# Patient Record
Sex: FEMALE | Race: WHITE | Employment: OTHER | URBAN - METROPOLITAN AREA
[De-identification: names, ages, dates, MRNs, and addresses within clinical notes are randomized per-mention and may not be internally consistent; named-entity substitution may affect disease eponyms.]

---

## 2019-01-21 ENCOUNTER — OFFICE VISIT (OUTPATIENT)
Dept: ENDOCRINOLOGY | Age: 69
End: 2019-01-21

## 2019-01-21 VITALS
DIASTOLIC BLOOD PRESSURE: 61 MMHG | WEIGHT: 191.4 LBS | HEIGHT: 65 IN | SYSTOLIC BLOOD PRESSURE: 124 MMHG | BODY MASS INDEX: 31.89 KG/M2 | HEART RATE: 77 BPM

## 2019-01-21 DIAGNOSIS — E11.9 TYPE 2 DIABETES MELLITUS WITHOUT COMPLICATION, WITHOUT LONG-TERM CURRENT USE OF INSULIN (HCC): Primary | ICD-10-CM

## 2019-01-21 RX ORDER — LISINOPRIL 20 MG/1
TABLET ORAL DAILY
COMMUNITY
End: 2021-01-12

## 2019-01-21 RX ORDER — METFORMIN HYDROCHLORIDE 500 MG/1
TABLET, EXTENDED RELEASE ORAL
Qty: 120 TAB | Refills: 11 | Status: SHIPPED | OUTPATIENT
Start: 2019-01-21 | End: 2020-01-24 | Stop reason: SDUPTHER

## 2019-01-21 RX ORDER — ASPIRIN 81 MG/1
TABLET ORAL DAILY
COMMUNITY

## 2019-01-21 RX ORDER — ATORVASTATIN CALCIUM 40 MG/1
TABLET, FILM COATED ORAL DAILY
COMMUNITY

## 2019-01-21 RX ORDER — NATEGLINIDE 120 MG/1
120 TABLET ORAL
COMMUNITY
End: 2020-06-15 | Stop reason: SDUPTHER

## 2019-01-21 RX ORDER — CHOLECALCIFEROL TAB 125 MCG (5000 UNIT) 125 MCG
TAB ORAL DAILY
COMMUNITY

## 2019-01-21 RX ORDER — METFORMIN HYDROCHLORIDE 1000 MG/1
1000 TABLET ORAL 2 TIMES DAILY WITH MEALS
COMMUNITY
End: 2019-01-21 | Stop reason: SINTOL

## 2019-01-21 RX ORDER — ESCITALOPRAM OXALATE 10 MG/1
10 TABLET ORAL DAILY
COMMUNITY

## 2019-01-21 NOTE — PATIENT INSTRUCTIONS
Walking for Exercise: Care Instructions Your Care Instructions Walking is one of the easiest ways to get the exercise you need for good health. A brisk, 30-minute walk each day can help you feel better and have more energy. It can help you lower your risk of disease. Walking can help you keep your bones strong and your heart healthy. Check with your doctor before you start a walking plan if you have heart problems, other health issues, or you have not been active in a long time. Follow your doctor's instructions for safe levels of exercise. Follow-up care is a key part of your treatment and safety. Be sure to make and go to all appointments, and call your doctor if you are having problems. It's also a good idea to know your test results and keep a list of the medicines you take. How can you care for yourself at home? Getting started · Start slowly and set a short-term goal. For example, walk for 5 or 10 minutes every day. · Bit by bit, increase the amount you walk every day. Try for at least 30 minutes on most days of the week. You also may want to swim, bike, or do other activities. · If finding enough time is a problem, it is fine to be active in blocks of 10 minutes or more throughout your day and week. · To get the heart-healthy benefits of walking, you need to walk briskly enough to increase your heart rate and breathing, but not so fast that you cannot talk comfortably. · Wear comfortable shoes that fit well and provide good support for your feet and ankles. Staying with your plan · After you've made walking a habit, set a longer-term goal. You may want to set a goal of walking briskly for longer or walking farther. Experts say to do 2½ hours of moderate activity a week. A faster heartbeat is what defines moderate-level activity. · To stay motivated, walk with friends, coworkers, or pets. · Use a phone amanda or pedometer to track your steps each day.  Set a goal to increase your steps. Once you get there, set a higher goal. Aim for 10,000 steps a day. · If the weather keeps you from walking outside, go for walks at the mall with a friend. Local schools and churches may have indoor gyms where you can walk. Fitting a walk into your workday · Park several blocks away from work, or get off the bus a few stops early. · Use the stairs instead of the elevator, at least for a few floors. · Suggest holding meetings with colleagues during a walk inside or outside the building. · Use the restroom that is the farthest from your desk or workstation. · Use your morning and afternoon breaks to take quick 15-minute walks. Staying safe · Know your surroundings. Walk in a well-lighted, safe place. If it is dark, walk with a partner. Wear light-colored clothing. If you can, buy a vest or jacket that reflects light. · Carry a cell phone for emergencies. · Drink plenty of water. Take a water bottle with you when you walk. This is very important if it is hot out. · Be careful not to slip on wet or icy ground. You can buy \"grippers\" for your shoes to help keep you from slipping. · Pay attention to your walking surface. Use sidewalks and paths. · If you have breathing problems like asthma or COPD, ask your doctor when it is safe for you to walk outdoors. Cold, dry air, smog, pollen, or other things in the air could cause breathing problems. Where can you learn more? Go to http://myke-ayse.info/. Enter R159 in the search box to learn more about \"Walking for Exercise: Care Instructions. \" Current as of: August 19, 2018 Content Version: 11.9 © 8807-4269 [employer] Care instructions adapted under license by [employer] (which disclaims liability or warranty for this information).  If you have questions about a medical condition or this instruction, always ask your healthcare professional. [employer] disclaims any warranty or liability for your use of this information. Learning About Diabetes Food Guidelines Your Care Instructions Meal planning is important to manage diabetes. It helps keep your blood sugar at a target level (which you set with your doctor). You don't have to eat special foods. You can eat what your family eats, including sweets once in a while. But you do have to pay attention to how often you eat and how much you eat of certain foods. You may want to work with a dietitian or a certified diabetes educator (CDE) to help you plan meals and snacks. A dietitian or CDE can also help you lose weight if that is one of your goals. What should you know about eating carbs? Managing the amount of carbohydrate (carbs) you eat is an important part of healthy meals when you have diabetes. Carbohydrate is found in many foods. · Learn which foods have carbs. And learn the amounts of carbs in different foods. ? Bread, cereal, pasta, and rice have about 15 grams of carbs in a serving. A serving is 1 slice of bread (1 ounce), ½ cup of cooked cereal, or 1/3 cup of cooked pasta or rice. ? Fruits have 15 grams of carbs in a serving. A serving is 1 small fresh fruit, such as an apple or orange; ½ of a banana; ½ cup of cooked or canned fruit; ½ cup of fruit juice; 1 cup of melon or raspberries; or 2 tablespoons of dried fruit. ? Milk and no-sugar-added yogurt have 15 grams of carbs in a serving. A serving is 1 cup of milk or 2/3 cup of no-sugar-added yogurt. ? Starchy vegetables have 15 grams of carbs in a serving. A serving is ½ cup of mashed potatoes or sweet potato; 1 cup winter squash; ½ of a small baked potato; ½ cup of cooked beans; or ½ cup cooked corn or green peas. · Learn how much carbs to eat each day and at each meal. A dietitian or CDE can teach you how to keep track of the amount of carbs you eat. This is called carbohydrate counting. · If you are not sure how to count carbohydrate grams, use the Plate Method to plan meals. It is a good, quick way to make sure that you have a balanced meal. It also helps you spread carbs throughout the day. ? Divide your plate by types of foods. Put non-starchy vegetables on half the plate, meat or other protein food on one-quarter of the plate, and a grain or starchy vegetable in the final quarter of the plate. To this you can add a small piece of fruit and 1 cup of milk or yogurt, depending on how many carbs you are supposed to eat at a meal. 
· Try to eat about the same amount of carbs at each meal. Do not \"save up\" your daily allowance of carbs to eat at one meal. 
· Proteins have very little or no carbs per serving. Examples of proteins are beef, chicken, turkey, fish, eggs, tofu, cheese, cottage cheese, and peanut butter. A serving size of meat is 3 ounces, which is about the size of a deck of cards. Examples of meat substitute serving sizes (equal to 1 ounce of meat) are 1/4 cup of cottage cheese, 1 egg, 1 tablespoon of peanut butter, and ½ cup of tofu. How can you eat out and still eat healthy? · Learn to estimate the serving sizes of foods that have carbohydrate. If you measure food at home, it will be easier to estimate the amount in a serving of restaurant food. · If the meal you order has too much carbohydrate (such as potatoes, corn, or baked beans), ask to have a low-carbohydrate food instead. Ask for a salad or green vegetables. · If you use insulin, check your blood sugar before and after eating out to help you plan how much to eat in the future. · If you eat more carbohydrate at a meal than you had planned, take a walk or do other exercise. This will help lower your blood sugar. What else should you know? · Limit saturated fat, such as the fat from meat and dairy products.  This is a healthy choice because people who have diabetes are at higher risk of heart disease. So choose lean cuts of meat and nonfat or low-fat dairy products. Use olive or canola oil instead of butter or shortening when cooking. · Don't skip meals. Your blood sugar may drop too low if you skip meals and take insulin or certain medicines for diabetes. · Check with your doctor before you drink alcohol. Alcohol can cause your blood sugar to drop too low. Alcohol can also cause a bad reaction if you take certain diabetes medicines. Follow-up care is a key part of your treatment and safety. Be sure to make and go to all appointments, and call your doctor if you are having problems. It's also a good idea to know your test results and keep a list of the medicines you take. Where can you learn more? Go to http://myke-ayse.info/. Enter Z790 in the search box to learn more about \"Learning About Diabetes Food Guidelines. \" Current as of: July 25, 2018 Content Version: 11.9 © 5010-7354 [employer]. Care instructions adapted under license by [employer] (which disclaims liability or warranty for this information). If you have questions about a medical condition or this instruction, always ask your healthcare professional. [address] any warranty or liability for your use of this information.

## 2019-01-21 NOTE — PROGRESS NOTES
Chief Complaint Patient presents with  Diabetes  
  pcp and pharmacy verified. Release signed for eye exam  
Records reviewed History of Present Illness: Ruiz Botello is a 77 y.o. female who I was asked to see in consult, by Dr. Hammond Erps is a new patient for evaluation of diabetes. Was diagnosed with diabetes 10+ years. Current regimen is Nateglinide 120mg TID, Metformin 1000mg BID, Victoza 1.8mg daily, Invokana 300mg daily. Checks blood sugars fasting every day and readings run 140-170's. She notes that over her last year, her A1C has been \"creeping up\". Most recent Hgb A1c was 7.3% in November 2018. Pt notes that the Metformin is causing issues of diarrhea. . A typical day is as follows: Pt wakes around 8AM. 
- breakfast: She has breakfast around 830AM, this AM she had a piece of sausage, a biscuit and hot tea (no sugar) - She does not tend to have a mid-morning snack 
- lunch: She has lunch around 1230PM, yesterday she had chow-mein with beef and mushrooms and water. - She will have a mid-afternoon snack around 3PM. She will have a cookie, peanuts or PB. 
- dinner: She has dinner around 6PM, last night she had vegetable soup and unsweetened tea. - She will have an evening snack (not every day). Typically a cookie. Exercise consists of housework. No history of vascular disease. No history of neuropathy, or nephropathy. Last eye exam was \"a year ago and I am due for my yearly\". No hx of retinopathy. Will request these records. Past Medical History:  
Diagnosis Date  Diabetes ([address]  Hypertension Past Surgical History:  
Procedure Laterality Date  HX ORTHOPAEDIC    
 replaced ligaments in both thumbs  HX PARTIAL HYSTERECTOMY  HX TONSIL AND ADENOIDECTOMY  HX WISDOM TEETH EXTRACTION    
 IR CHOLECYSTOSTOMY PERCUTANEOUS Current Outpatient Medications Medication Sig  liraglutide (VICTOZA) 0.6 mg/0.1 mL (18 mg/3 mL) pnij 1.8 mg by SubCUTAneous route.  nateglinide (STARLIX) 120 mg tablet Take 120 mg by mouth Before breakfast, lunch, and dinner.  canagliflozin (INVOKANA) 300 mg tablet Take  by mouth Daily (before breakfast).  lisinopril (PRINIVIL, ZESTRIL) 20 mg tablet Take  by mouth daily.  atorvastatin (LIPITOR) 40 mg tablet Take  by mouth daily.  escitalopram oxalate (LEXAPRO) 10 mg tablet Take 10 mg by mouth daily.  calcium-cholecalciferol, d3, (CALCIUM 600 + D) 600-125 mg-unit tab Take  by mouth two (2) times a day.  cholecalciferol, VITAMIN D3, (VITAMIN D3) 5,000 unit tab tablet Take  by mouth daily.  aspirin delayed-release 81 mg tablet Take  by mouth daily.  metFORMIN ER (GLUCOPHAGE XR) 500 mg tablet Two pills with breakfast and two pills with dinner No current facility-administered medications for this visit. Allergies Allergen Reactions  Codeine Itching and Swelling Family History Problem Relation Age of Onset  Diabetes Mother  Heart Failure Mother  Heart Attack Father  Arthritis-osteo Sister  Diabetes Sister Borderline  Heart Disease Brother   
     as an infant  Heart Failure Maternal Grandmother  Diabetes Maternal Grandmother  Heart Attack Maternal Grandfather  No Known Problems Paternal Grandmother  No Known Problems Paternal Grandfather  Diabetes Maternal Aunt  Cancer Maternal Uncle Lung Social History Socioeconomic History  Marital status:  Spouse name: Not on file  Number of children: Not on file  Years of education: Not on file  Highest education level: Not on file Social Needs  Financial resource strain: Not on file  Food insecurity - worry: Not on file  Food insecurity - inability: Not on file  Transportation needs - medical: Not on file  Transportation needs - non-medical: Not on file Occupational History  Not on file Tobacco Use  
  Smoking status: Never Smoker  Smokeless tobacco: Never Used Substance and Sexual Activity  Alcohol use: Yes Comment: occasionally  Drug use: No  
 Sexual activity: Not on file Other Topics Concern  Not on file Social History Narrative  Not on file Review of Systems: 
- Constitutional Symptoms: no fevers, chills, weight loss - Eyes: no blurry vision or double vision - Cardiovascular: no chest pain or palpitations - Respiratory: no cough or shortness of breath 
- Gastrointestinal: no dysphagia or abdominal pain - Musculoskeletal: no joint pains or weakness - Integumentary: no rashes - Neurological: no numbness, tingling, or headaches - Psychiatric: no depression or anxiety - Endocrine: no heat or cold intolerance, no polyuria or polydipsia Physical Examination: 
Blood pressure 124/61, pulse 77, height 5' 4.75\" (1.645 m), weight 191 lb 6.4 oz (86.8 kg). - General: pleasant, no distress, good eye contact 
- HEENT: no exopthalmos, no periorbital edema, no scleral/conjunctival injection, EOMI, no lid lag or stare 
- Neck: supple, no thyromegaly, masses, lymph nodes, or carotid bruits, no supraclavicular or dorsocervical fat pads - Cardiovascular: regular, normal rate, normal S1 and S2, no murmurs/rubs/gallops, 2+ dorsalis pedis pulses bilaterally - Respiratory: clear to auscultation bilaterally - Gastrointestinal: soft, nontender, nondistended, no masses, no hepatosplenomegaly - Musculoskeletal: no proximal muscle weakness in upper or lower extremities - Integumentary: no acanthosis nigricans, no abdominal striae, no rashes, no edema, no foot ulcers - Neurological: DTR 2+, no tremors - Psychiatric: normal mood and affect Diabetic foot exam:  
 
Left Foot: 
 Visual Exam: normal  
 Pulse DP: 2+ (normal) Filament test: normal sensation Vibratory sensation: normal 
   
Right Foot: 
 Visual Exam: normal  
 Pulse DP: 2+ (normal) Filament test: normal sensation Vibratory sensation: normal 
 
 
 
Data Reviewed:  
- Hgb A1c 7.3% 
- lipids: total 113 ,  HDL 34, , LDL 26 
- ALT 34, AST 23 
- BUN/Cr 15/0.50 Assessment/Plan: 1. Type 2 diabetes mellitus without complication, without long-term current use of insulin ([address] 1) Pt's BGs and A1C have been increasing gradually over the last year. Her most recent A1C was 7.3%. Pt does not seem to be eating an excessive amount of carbs, though she is not physically active. Before we make any changes in her medications, I recommended that she first work on increasing her physical activity. Pt given copy of \"Daily Diabetes Meal Planning Guide\" and educated about the \"[place] dinner plate\", reading food labels and which foods have the highest carbohydrates. Pt instructed to limit her carbohydrates to 45-60 grams with meals and 15 grams or less with snacks (but to limit snacks). Pt to check her BGs daily and mail her BG logs to me in 6 weeks. Will check an A1C and urine MA today. Pt voices understanding and agreement with the plan. RTC 3 months Patient Instructions Walking for Exercise: Care Instructions Your Care Instructions Walking is one of the easiest ways to get the exercise you need for good health. A brisk, 30-minute walk each day can help you feel better and have more energy. It can help you lower your risk of disease. Walking can help you keep your bones strong and your heart healthy. Check with your doctor before you start a walking plan if you have heart problems, other health issues, or you have not been active in a long time. Follow your doctor's instructions for safe levels of exercise. Follow-up care is a key part of your treatment and safety. Be sure to make and go to all appointments, and call your doctor if you are having problems. It's also a good idea to know your test results and keep a list of the medicines you take. How can you care for yourself at home? Getting started · Start slowly and set a short-term goal. For example, walk for 5 or 10 minutes every day. · Bit by bit, increase the amount you walk every day. Try for at least 30 minutes on most days of the week. You also may want to swim, bike, or do other activities. · If finding enough time is a problem, it is fine to be active in blocks of 10 minutes or more throughout your day and week. · To get the heart-healthy benefits of walking, you need to walk briskly enough to increase your heart rate and breathing, but not so fast that you cannot talk comfortably. · Wear comfortable shoes that fit well and provide good support for your feet and ankles. Staying with your plan · After you've made walking a habit, set a longer-term goal. You may want to set a goal of walking briskly for longer or walking farther. Experts say to do 2½ hours of moderate activity a week. A faster heartbeat is what defines moderate-level activity. · To stay motivated, walk with friends, coworkers, or pets. · Use a phone amanda or pedometer to track your steps each day. Set a goal to increase your steps. Once you get there, set a higher goal. Aim for 10,000 steps a day. · If the weather keeps you from walking outside, go for walks at the mall with a friend. Local schools and churches may have indoor gyms where you can walk. Fitting a walk into your workday · Park several blocks away from work, or get off the bus a few stops early. · Use the stairs instead of the elevator, at least for a few floors. · Suggest holding meetings with colleagues during a walk inside or outside the building. · Use the restroom that is the farthest from your desk or workstation. · Use your morning and afternoon breaks to take quick 15-minute walks. Staying safe · Know your surroundings. Walk in a well-lighted, safe place. If it is dark, walk with a partner. Wear light-colored clothing. If you can, buy a vest or jacket that reflects light. · Carry a cell phone for emergencies. · Drink plenty of water. Take a water bottle with you when you walk. This is very important if it is hot out. · Be careful not to slip on wet or icy ground. You can buy \"grippers\" for your shoes to help keep you from slipping. · Pay attention to your walking surface. Use sidewalks and paths. · If you have breathing problems like asthma or COPD, ask your doctor when it is safe for you to walk outdoors. Cold, dry air, smog, pollen, or other things in the air could cause breathing problems. Where can you learn more? Go to http://myke-ayse.info/. Enter R159 in the search box to learn more about \"Walking for Exercise: Care Instructions. \" Current as of: August 19, 2018 Content Version: 11.9 © 4555-7850 [employer] [place]. Care instructions adapted under license by [employer] (which disclaims liability or warranty for this information). If you have questions about a medical condition or this instruction, always ask your healthcare professional. [address] any warranty or liability for your use of this information. Learning About Diabetes Food Guidelines Your Care Instructions Meal planning is important to manage diabetes. It helps keep your blood sugar at a target level (which you set with your doctor). You don't have to eat special foods. You can eat what your family eats, including sweets once in a while. But you do have to pay attention to how often you eat and how much you eat of certain foods. You may want to work with a dietitian or a certified diabetes educator (CDE) to help you plan meals and snacks. A dietitian or CDE can also help you lose weight if that is one of your goals. What should you know about eating carbs? Managing the amount of carbohydrate (carbs) you eat is an important part of healthy meals when you have diabetes. Carbohydrate is found in many foods. · Learn which foods have carbs. And learn the amounts of carbs in different foods. ? Bread, cereal, pasta, and rice have about 15 grams of carbs in a serving. A serving is 1 slice of bread (1 ounce), ½ cup of cooked cereal, or 1/3 cup of cooked pasta or rice. ? Fruits have 15 grams of carbs in a serving. A serving is 1 small fresh fruit, such as an apple or orange; ½ of a banana; ½ cup of cooked or canned fruit; ½ cup of fruit juice; 1 cup of melon or raspberries; or 2 tablespoons of dried fruit. ? Milk and no-sugar-added yogurt have 15 grams of carbs in a serving. A serving is 1 cup of milk or 2/3 cup of no-sugar-added yogurt. ? Starchy vegetables have 15 grams of carbs in a serving. A serving is ½ cup of mashed potatoes or sweet potato; 1 cup winter squash; ½ of a small baked potato; ½ cup of cooked beans; or ½ cup cooked corn or green peas. · Learn how much carbs to eat each day and at each meal. A dietitian or CDE can teach you how to keep track of the amount of carbs you eat. This is called carbohydrate counting. · If you are not sure how to count carbohydrate grams, use the Plate Method to plan meals. It is a good, quick way to make sure that you have a balanced meal. It also helps you spread carbs throughout the day. ? Divide your plate by types of foods. Put non-starchy vegetables on half the plate, meat or other protein food on one-quarter of the plate, and a grain or starchy vegetable in the final quarter of the plate. To this you can add a small piece of fruit and 1 cup of milk or yogurt, depending on how many carbs you are supposed to eat at a meal. 
· Try to eat about the same amount of carbs at each meal. Do not \"save up\" your daily allowance of carbs to eat at one meal. 
· Proteins have very little or no carbs per serving. Examples of proteins are beef, chicken, turkey, fish, eggs, tofu, cheese, cottage cheese, and peanut butter.  A serving size of meat is 3 ounces, which is about the size of a deck of cards. Examples of meat substitute serving sizes (equal to 1 ounce of meat) are 1/4 cup of cottage cheese, 1 egg, 1 tablespoon of peanut butter, and ½ cup of tofu. How can you eat out and still eat healthy? · Learn to estimate the serving sizes of foods that have carbohydrate. If you measure food at home, it will be easier to estimate the amount in a serving of restaurant food. · If the meal you order has too much carbohydrate (such as potatoes, corn, or baked beans), ask to have a low-carbohydrate food instead. Ask for a salad or green vegetables. · If you use insulin, check your blood sugar before and after eating out to help you plan how much to eat in the future. · If you eat more carbohydrate at a meal than you had planned, take a walk or do other exercise. This will help lower your blood sugar. What else should you know? · Limit saturated fat, such as the fat from meat and dairy products. This is a healthy choice because people who have diabetes are at higher risk of heart disease. So choose lean cuts of meat and nonfat or low-fat dairy products. Use olive or canola oil instead of butter or shortening when cooking. · Don't skip meals. Your blood sugar may drop too low if you skip meals and take insulin or certain medicines for diabetes. · Check with your doctor before you drink alcohol. Alcohol can cause your blood sugar to drop too low. Alcohol can also cause a bad reaction if you take certain diabetes medicines. Follow-up care is a key part of your treatment and safety. Be sure to make and go to all appointments, and call your doctor if you are having problems. It's also a good idea to know your test results and keep a list of the medicines you take. Where can you learn more? Go to http://myke-ayse.info/. Enter D295 in the search box to learn more about \"Learning About Diabetes Food Guidelines. \" Current as of: July 25, 2018 Content Version: 11.9 © 1789-4201 [employer]. Care instructions adapted under license by [employer] (which disclaims liability or warranty for this information). If you have questions about a medical condition or this instruction, always ask your healthcare professional. [address] any warranty or liability for your use of this information. Follow-up Disposition: 
Return in about 3 months (around 4/21/2019). Copy sent to: Dr. Jacqueline Michel

## 2019-01-21 NOTE — LETTER
1/21/2019 1:45 PM 
 
Patient:  Tammi Loaiza YOB: 1950 Date of Visit: 1/21/2019 Dear Suzie Conti MD 
[address] [address] [id number] VIA Facsimile: [phone] 
 : Thank you for referring Ms. Tammi Loaiza to me for evaluation/treatment. Below are the relevant portions of my assessment and plan of care. If you have questions, please do not hesitate to call me. I look forward to following Ms. Cathleen Goldberg along with you. Sincerely, Frantz Cantrell MD

## 2019-01-22 LAB
ALBUMIN/CREAT UR: <5.6 MG/G CREAT (ref 0–30)
CREAT UR-MCNC: 53.5 MG/DL
EST. AVERAGE GLUCOSE BLD GHB EST-MCNC: 174 MG/DL
HBA1C MFR BLD: 7.7 % (ref 4.8–5.6)
MICROALBUMIN UR-MCNC: <3 UG/ML

## 2019-03-12 ENCOUNTER — TELEPHONE (OUTPATIENT)
Dept: ENDOCRINOLOGY | Age: 69
End: 2019-03-12

## 2019-05-06 NOTE — TELEPHONE ENCOUNTER
Pt called, she wants to know if Dr. Yokasta El can Yokasta El can refill her Rx Invokana 300 mg. She stated her PCP was getting her refills before, since Dr. Yokasta El now is the one who's taking care of her diabetes. She states her PCP would no longer refill this for her. Any questions, she can be reach @ [phone].

## 2019-07-16 ENCOUNTER — OFFICE VISIT (OUTPATIENT)
Dept: ENDOCRINOLOGY | Age: 69
End: 2019-07-16

## 2019-07-16 VITALS
BODY MASS INDEX: 31.55 KG/M2 | SYSTOLIC BLOOD PRESSURE: 114 MMHG | HEART RATE: 92 BPM | RESPIRATION RATE: 16 BRPM | DIASTOLIC BLOOD PRESSURE: 58 MMHG | HEIGHT: 64 IN | WEIGHT: 184.8 LBS | OXYGEN SATURATION: 96 %

## 2019-07-16 DIAGNOSIS — E11.9 TYPE 2 DIABETES MELLITUS WITHOUT COMPLICATION, WITHOUT LONG-TERM CURRENT USE OF INSULIN (HCC): Primary | ICD-10-CM

## 2019-07-16 LAB — HBA1C MFR BLD HPLC: 6.6 %

## 2019-07-16 NOTE — PROGRESS NOTES
Chief Complaint   Patient presents with    Diabetes   Records since last visit reviewed. History of Present Illness: Henry Peralta is a 68 y.o. female here for follow up of diabetes. She was diagnosed with diabetes 10+ years. At our initial visit in January 2019 her regimen consisted of Nateglinide 120mg TID, Metformin 1000mg BID, Victoza 1.8mg daily and Invokana 300mg daily. Her BGs and A1C had been slowly increasing over the last year and her A1C in January 2019 was 7.7%. She was not eating an excessive amount of carbs, but she was not very active so I recommended she work on increasing her physical activities, but not change her DM regimen. Her A1C today was down to 6.6%. She denies issues of illnesses, injuries or hospitalizations since our last visit. Nateglinide 120mg TID, Metformin 1000mg BID, Victoza 1.8mg daily and Invokana 300mg daily. Checks blood sugars fasting every day and readings run 130-190's in July. Pt notes that if she eats a starch in the evening it will cause her BGs to run much higher the next morning. She denies issues of hypoglycemia. A typical day is as follows:  Pt wakes around 8AM.  - breakfast: She has breakfast around 830AM, this AM she had a bowl of cereal and tomato juice. - She does not tend to have a mid-morning snack  - lunch: She has lunch around 1230PM, this afternoon she had chicken salad, no bread or crackers and water. - She will have a mid-afternoon snack around 3PM. She will have a cookie, peanuts or PB crackers. - dinner: She has dinner around 6PM, last night she had shrimp chow mein, some rice and water. - She will have an evening snack (not every day). Typically a cookie. Exercise consists of housework. She goes to knitting and quilting classes at [employer]. No history of vascular disease. No history of neuropathy, or nephropathy. Last eye exam was January 2019, no retinopathy, record reviewed.        Current Outpatient Medications Medication Sig    canagliflozin (INVOKANA) 300 mg tablet Take 1 Tab by mouth Daily (before breakfast).  liraglutide (VICTOZA) 0.6 mg/0.1 mL (18 mg/3 mL) pnij 1.8 mg by SubCUTAneous route.  nateglinide (STARLIX) 120 mg tablet Take 120 mg by mouth Before breakfast, lunch, and dinner.  lisinopril (PRINIVIL, ZESTRIL) 20 mg tablet Take  by mouth daily.  atorvastatin (LIPITOR) 40 mg tablet Take  by mouth daily.  escitalopram oxalate (LEXAPRO) 10 mg tablet Take 10 mg by mouth daily.  calcium-cholecalciferol, d3, (CALCIUM 600 + D) 600-125 mg-unit tab Take  by mouth two (2) times a day.  cholecalciferol, VITAMIN D3, (VITAMIN D3) 5,000 unit tab tablet Take  by mouth daily.  aspirin delayed-release 81 mg tablet Take  by mouth daily.  metFORMIN ER (GLUCOPHAGE XR) 500 mg tablet Two pills with breakfast and two pills with dinner     No current facility-administered medications for this visit. Allergies   Allergen Reactions    Codeine Itching and Swelling     Review of Systems:  - Eyes: no blurry vision or double vision  - Cardiovascular: no chest pain  - Respiratory: no shortness of breath  - Musculoskeletal: no myalgias  - Neurological: no numbness/tingling in extremities    Physical Examination:  Blood pressure 114/58, pulse 92, resp. rate 16, height 5' 4\" (1.626 m), weight 184 lb 12.8 oz (83.8 kg), SpO2 96 %.   - General: pleasant, no distress, good eye contact   - Neck: no carotid bruits  - Cardiovascular: regular, normal rate, nl s1 and s2, no m/r/g, 2+ DP pulses   - Respiratory: clear bilaterally  - Integumentary: no edema, no foot ulcers,   - Psychiatric: normal mood and affect    Diabetic foot exam:     Left Foot:   Visual Exam: normal    Pulse DP: 2+ (normal)   Filament test: normal sensation    Vibratory sensation: normal      Right Foot:   Visual Exam: normal    Pulse DP: 2+ (normal)   Filament test: normal sensation    Vibratory sensation: normal        Data Reviewed:   Her A1C today was 6.6%. Assessment/Plan:   1) DM > Her A1C today was 6.6%. Pt encouraged to keep up the good work and continue the Nateglinide 120mg TID, Metformin 1000mg BID, Victoza 1.8mg daily and Invokana 300mg daily. I recommended pt to go to [employer] 30 minutes early to her knitting classes three days per week so she could walk in the [facility]. Pt to check her BGs daily and mail her BG logs to me in 6 weeks. Pt voices understanding and agreement with the plan. RTC 3 months  There are no Patient Instructions on file for this visit. Follow-up and Dispositions    · Return in about 3 months (around 10/16/2019).          Copy sent to:  Dr. Darby Perez

## 2019-09-04 ENCOUNTER — TELEPHONE (OUTPATIENT)
Dept: ENDOCRINOLOGY | Age: 69
End: 2019-09-04

## 2019-09-04 NOTE — TELEPHONE ENCOUNTER
9/4/2019  2:12 PM    Patient stated she received a text to call the office. She would like a call back.     Thanks

## 2019-09-04 NOTE — TELEPHONE ENCOUNTER
Reviewed BG logs. Pt instructed to continue the Nateglinide 120mg TID, Metformin 1000mg BID, Victoza 1.8mg daily and Invokana 300mg daily. Pt voices understanding and agreement with the plan.

## 2019-09-16 NOTE — TELEPHONE ENCOUNTER
9/16/2019  9:51 AM        Ms.  Javier Cory called stating that a new prescription is needed for   liraglutide (VICTOZA) 0.6 mg/0.1 mL (18 mg/3 mL) pnij    Please send to   [id number] [facility]        Thanks

## 2019-10-18 ENCOUNTER — OFFICE VISIT (OUTPATIENT)
Dept: ENDOCRINOLOGY | Age: 69
End: 2019-10-18

## 2019-10-18 VITALS
WEIGHT: 188.4 LBS | HEART RATE: 93 BPM | SYSTOLIC BLOOD PRESSURE: 121 MMHG | BODY MASS INDEX: 32.17 KG/M2 | DIASTOLIC BLOOD PRESSURE: 63 MMHG | HEIGHT: 64 IN

## 2019-10-18 DIAGNOSIS — E11.9 TYPE 2 DIABETES MELLITUS WITHOUT COMPLICATION, WITHOUT LONG-TERM CURRENT USE OF INSULIN (HCC): Primary | ICD-10-CM

## 2019-10-18 LAB — HBA1C MFR BLD HPLC: 6.9 %

## 2019-10-18 RX ORDER — PEN NEEDLE, DIABETIC 32GX 5/32"
NEEDLE, DISPOSABLE MISCELLANEOUS
COMMUNITY
Start: 2019-07-24

## 2019-10-18 RX ORDER — LANCETS
EACH MISCELLANEOUS
Refills: 0 | COMMUNITY
Start: 2019-10-07 | End: 2020-04-10

## 2019-10-18 NOTE — PROGRESS NOTES
Chief Complaint   Patient presents with    Diabetes     pcp and pharmacy verified. Eye exam UTD   Records since last visit reviewed. History of Present Illness: Robbert Osler is a 68 y.o. female here for follow up of diabetes. She was diagnosed with diabetes 10+ years. Her A1C today was down to 6.9%. Pt fell in September. She saw Dr. Eda Estevez who sent her for PT. Nateglinide 120mg TID, Metformin 1000mg BID, Victoza 1.8mg daily and Invokana 300mg daily. Checks blood sugars fasting every day and readings run 150-180's. She denies issues of hypoglycemia. A typical day is as follows:  Pt wakes around 8AM.  - breakfast: She has breakfast around 830AM, yesterday she had a waffle, a piece of sausage, hash browns and water. - She does not tend to have a mid-morning snack  - lunch: She has lunch around 1230PM, yesterday afternoon she had a tortilla with turkey breast and cheese and water. - She will have a mid-afternoon snack around 3PM. She will have a cookie, peanuts or PB crackers. - dinner: She has dinner around 6PM, last night she had steak, sweet potato, salad and water. - She has been working on stopping the evening snacking, she will occasionally have a PB cracker. Exercise consists of housework. She goes to knitting and quilting classes at [employer]. No history of vascular disease. No history of neuropathy, or nephropathy. Last eye exam was January 2019, no retinopathy, record reviewed. Current Outpatient Medications   Medication Sig    liraglutide (VICTOZA) 0.6 mg/0.1 mL (18 mg/3 mL) pnij 1.8 mg by SubCUTAneous route daily.  canagliflozin (INVOKANA) 300 mg tablet Take 1 Tab by mouth Daily (before breakfast).  nateglinide (STARLIX) 120 mg tablet Take 120 mg by mouth Before breakfast, lunch, and dinner.  lisinopril (PRINIVIL, ZESTRIL) 20 mg tablet Take  by mouth daily.  atorvastatin (LIPITOR) 40 mg tablet Take  by mouth daily.     escitalopram oxalate (LEXAPRO) 10 mg tablet Take 10 mg by mouth daily.  calcium-cholecalciferol, d3, (CALCIUM 600 + D) 600-125 mg-unit tab Take  by mouth two (2) times a day.  cholecalciferol, VITAMIN D3, (VITAMIN D3) 5,000 unit tab tablet Take  by mouth daily.  aspirin delayed-release 81 mg tablet Take  by mouth daily.  metFORMIN ER (GLUCOPHAGE XR) 500 mg tablet Two pills with breakfast and two pills with dinner    ONETOUCH ULTRA BLUE TEST STRIP strip     ONETOUCH ULTRASOFT LANCETS misc     PRICILLA PEN NEEDLE 32 gauge x 5/32\" ndle      No current facility-administered medications for this visit. Allergies   Allergen Reactions    Codeine Itching and Swelling     Review of Systems:  - Eyes: no blurry vision or double vision  - Cardiovascular: no chest pain  - Respiratory: no shortness of breath  - Musculoskeletal: no myalgias  - Neurological: no numbness/tingling in extremities    Physical Examination:  Blood pressure 121/63, pulse 93, height 5' 4\" (1.626 m), weight 188 lb 6.4 oz (85.5 kg). - General: pleasant, no distress, good eye contact   - Neck: no carotid bruits  - Cardiovascular: regular, normal rate, nl s1 and s2, no m/r/g, 2+ DP pulses   - Respiratory: clear bilaterally  - Integumentary: no edema, no foot ulcers,   - Psychiatric: normal mood and affect    Diabetic foot exam:     Left Foot:   Visual Exam: normal    Pulse DP: 2+ (normal)   Filament test: normal sensation    Vibratory sensation: normal      Right Foot:   Visual Exam: normal    Pulse DP: 2+ (normal)   Filament test: normal sensation    Vibratory sensation: normal        Data Reviewed:   Her A1C today was 6.9%. Assessment/Plan:   1) DM > Her A1C today was 6.9%. Pt encouraged to keep up the good work and continue the Nateglinide 120mg TID, Metformin 1000mg BID, Victoza 1.8mg daily and Invokana 300mg daily. Pt to check her BGs daily and mail her BG logs to me in 6 weeks. Pt voices understanding and agreement with the plan.     RTC 3 months  There are no Patient Instructions on file for this visit. Follow-up and Dispositions    · Return in about 3 months (around 1/18/2020).          Copy sent to:  Dr. Agusto Carson

## 2019-11-04 RX ORDER — CANAGLIFLOZIN 300 MG/1
TABLET, FILM COATED ORAL
Qty: 30 TAB | Refills: 5 | Status: SHIPPED | OUTPATIENT
Start: 2019-11-04 | End: 2020-01-24 | Stop reason: SDUPTHER

## 2019-12-03 ENCOUNTER — TELEPHONE (OUTPATIENT)
Dept: ENDOCRINOLOGY | Age: 69
End: 2019-12-03

## 2019-12-03 NOTE — TELEPHONE ENCOUNTER
Reviewed BG logs. Called pt and instructed her to continue the Nateglinide 120mg TID, Metformin 1000mg BID, Victoza 1.8mg daily and Invokana 300mg daily.

## 2019-12-05 ENCOUNTER — TELEPHONE (OUTPATIENT)
Dept: ENDOCRINOLOGY | Age: 69
End: 2019-12-05

## 2019-12-05 NOTE — TELEPHONE ENCOUNTER
Left a message on Nia's VM stating than an appeal was written and faxed yesterday. Is this the denial of the appeal or the original denial of Invokana?

## 2019-12-05 NOTE — TELEPHONE ENCOUNTER
----- Message from Toño Ray sent at 12/5/2019 10:35 AM EST -----  Regarding: /telephone  General Message/Vendor Calls    Caller's first and last name: Miguelito Mena with [facility]. Reason for call: Miguelito Mena stated that the request for \"Envoc[employer]\" was not approved. Callback required yes/no and why: yes.       Best contact number(s): [phone]      Details to clarify the request:

## 2019-12-10 NOTE — TELEPHONE ENCOUNTER
Per Clemencia Barone at [facility], Part D, no PA is needed. A paid claim was submitted on 11/28/19. Patient has tried to fill too soon, which is why the PA was requested.

## 2019-12-16 NOTE — TELEPHONE ENCOUNTER
Addendum: 12/16/2019, 10:41 AM  Spoke with Darryn Thompson by phone,    Received a letter from [employer] denying the request for an appeal. Patient states that she did  a rx for Invokana and all is straight.   David Mahoney LPN

## 2020-01-24 ENCOUNTER — OFFICE VISIT (OUTPATIENT)
Dept: ENDOCRINOLOGY | Age: 70
End: 2020-01-24

## 2020-01-24 VITALS
HEIGHT: 64 IN | HEART RATE: 86 BPM | WEIGHT: 188.8 LBS | SYSTOLIC BLOOD PRESSURE: 121 MMHG | BODY MASS INDEX: 32.23 KG/M2 | DIASTOLIC BLOOD PRESSURE: 58 MMHG

## 2020-01-24 DIAGNOSIS — E11.9 TYPE 2 DIABETES MELLITUS WITHOUT COMPLICATION, WITHOUT LONG-TERM CURRENT USE OF INSULIN (HCC): Primary | ICD-10-CM

## 2020-01-24 LAB — HBA1C MFR BLD HPLC: 6.8 %

## 2020-01-24 RX ORDER — METFORMIN HYDROCHLORIDE 500 MG/1
TABLET, EXTENDED RELEASE ORAL
Qty: 180 TAB | Refills: 3 | Status: SHIPPED | OUTPATIENT
Start: 2020-01-24 | End: 2020-04-02 | Stop reason: SDUPTHER

## 2020-01-24 NOTE — PROGRESS NOTES
Chief Complaint   Patient presents with    Diabetes     pcp and pharmacy verified. Eye exam UTD   Records since last visit reviewed. History of Present Illness: Christoph English is a 68 y.o. female here for follow up of diabetes. She was diagnosed with diabetes 10+ years. Her A1C today was down to 6.8%. Nateglinide 120mg TID, Metformin 1000mg BID, Victoza 1.8mg daily and Invokana 300mg daily. Checks blood sugars fasting every day and readings run 150-180's. She denies issues of hypoglycemia. A typical day is as follows:  Pt wakes around 8AM.  - breakfast: She has breakfast around 830AM, yesterday she had a bowl of cereal, and banana and water. - She does not tend to have a mid-morning snack  - lunch: She has lunch around 1230PM, yesterday afternoon she had chicken wings, a salad and water. - She has been cutting out the afternoon snack. - dinner: She has dinner around 6PM, last night she had a ham and cheese sandwich and water. - She has been working on stopping the evening snacking, she will occasionally have a PB cracker. Exercise consists of housework. She goes to knitting and quilting classes at [employer]. No history of vascular disease. No history of neuropathy, or nephropathy. Last eye exam was July 2019, no retinopathy, record reviewed. Current Outpatient Medications   Medication Sig    canagliflozin (INVOKANA) 300 mg tablet take 1 tablet by mouth daily before BREAKFAST    metFORMIN ER (GLUCOPHAGE XR) 500 mg tablet 1 tablet with breakfast and 1 tablet with dinner    ONETOUCH ULTRA BLUE TEST STRIP strip     ONETOUCH ULTRASOFT LANCETS [facility]     PRICILLA PEN NEEDLE 32 gauge x 5/32\" ndle     liraglutide (VICTOZA) 0.6 mg/0.1 mL (18 mg/3 mL) pnij 1.8 mg by SubCUTAneous route daily.  nateglinide (STARLIX) 120 mg tablet Take 120 mg by mouth Before breakfast, lunch, and dinner.  lisinopril (PRINIVIL, ZESTRIL) 20 mg tablet Take  by mouth daily.     atorvastatin (LIPITOR) 40 mg tablet Take  by mouth daily.  escitalopram oxalate (LEXAPRO) 10 mg tablet Take 10 mg by mouth daily.  calcium-cholecalciferol, d3, (CALCIUM 600 + D) 600-125 mg-unit tab Take  by mouth two (2) times a day.  cholecalciferol, VITAMIN D3, (VITAMIN D3) 5,000 unit tab tablet Take  by mouth daily.  aspirin delayed-release 81 mg tablet Take  by mouth daily. No current facility-administered medications for this visit. Allergies   Allergen Reactions    Codeine Itching and Swelling     Review of Systems:  - Eyes: no blurry vision or double vision  - Cardiovascular: no chest pain  - Respiratory: no shortness of breath  - Musculoskeletal: no myalgias  - Neurological: no numbness/tingling in extremities    Physical Examination:  Blood pressure 121/58, pulse 86, height 5' 4\" (1.626 m), weight 188 lb 12.8 oz (85.6 kg). - General: pleasant, no distress, good eye contact   - Neck: no carotid bruits  - Cardiovascular: regular, normal rate, nl s1 and s2, no m/r/g, 2+ DP pulses   - Respiratory: clear bilaterally  - Integumentary: no edema, no foot ulcers,   - Psychiatric: normal mood and affect    Diabetic foot exam:     Left Foot:   Visual Exam: normal    Pulse DP: 2+ (normal)   Filament test: normal sensation    Vibratory sensation: normal      Right Foot:   Visual Exam: normal    Pulse DP: 2+ (normal)   Filament test: normal sensation    Vibratory sensation: normal        Data Reviewed:   Her A1C today was 6.8%. Assessment/Plan:   1) DM > Her A1C today was 6.8%. Pt encouraged to keep up the good work and continue the Nateglinide 120mg TID, Metformin 1000mg BID, Victoza 1.8mg daily and Invokana 300mg daily. Pt to check her BGs daily and mail her BG logs to me in 6 weeks. Pt voices understanding and agreement with the plan.     RTC 3 months      Copy sent to:  Dr. Sarabjit Lucero

## 2020-04-02 RX ORDER — METFORMIN HYDROCHLORIDE 500 MG/1
TABLET, EXTENDED RELEASE ORAL
Qty: 360 TAB | Refills: 3 | Status: SHIPPED | OUTPATIENT
Start: 2020-04-02 | End: 2021-03-13

## 2020-04-02 NOTE — TELEPHONE ENCOUNTER
Patient noticed that the Rx she has says 500 mg twice a daily (see last Rx). She takes 2 tabs BID. Advised that a new Rx will be sent to [facility] today. Patient expressed understanding.

## 2020-04-10 RX ORDER — LANCETS
EACH MISCELLANEOUS
Qty: 200 EACH | Refills: 3 | Status: SHIPPED | OUTPATIENT
Start: 2020-04-10 | End: 2021-07-02

## 2020-04-24 DIAGNOSIS — E11.9 TYPE 2 DIABETES MELLITUS WITHOUT COMPLICATION, WITHOUT LONG-TERM CURRENT USE OF INSULIN (HCC): ICD-10-CM

## 2020-05-04 ENCOUNTER — VIRTUAL VISIT (OUTPATIENT)
Dept: ENDOCRINOLOGY | Age: 70
End: 2020-05-04

## 2020-05-04 DIAGNOSIS — E11.9 TYPE 2 DIABETES MELLITUS WITHOUT COMPLICATION, WITHOUT LONG-TERM CURRENT USE OF INSULIN (HCC): Primary | ICD-10-CM

## 2020-05-04 NOTE — PROGRESS NOTES
Chief Complaint   Patient presents with    Diabetes     pcp and pharmacy verified. Eye exam UTD. DOXY. ME   Records since last visit reviewed. **THIS IS A VIRTUAL VISIT VIA A VIDEO ENCOUNTER. PATIENT AGREED TO HAVE THEIR CARE DELIVERED OVER VIDEO IN PLACE OF THEIR REGULARLY SCHEDULED OFFICE VISIT**      History of Present Illness: Abran Duverney is a 65 y.o. female here for follow up of diabetes. She was diagnosed with diabetes 10+ years. At our last visit in January 2020 was 6.8%. Pt had a fall and chipped a bone in her left wrist.  She notes that with all the stress of the COVID and the fracture her BGs has been higher than normal.  She is checking her BGs every morning and her BGs have been running in the 140-200. She denies issues of hypoglycemia. She notes that with the quarantine her food choices have been changing to foods that will last longer and has been more carbs. She has not been as active either. Nateglinide 120mg TID, Metformin 1000mg BID, Victoza 1.8mg daily and Invokana 300mg daily. A typical day is as follows:  Pt wakes around 7-8AM.  - breakfast: She has breakfast around 830AM, yesterday she had an egg, one link of sausage with cheese and tomato juice. - She does not tend to have a mid-morning snack  - lunch: She has lunch around 1230-130PM, yesterday afternoon she had spaghetti, peas, celery, onions and tomatoes and water. - She has been cutting out the afternoon snack. - dinner: She has dinner around 6PM, last night she had home-made vegetable soup and water. - She has been working on stopping the evening snacking, she will occasionally have a PB cracker. Exercise consists of housework. She goes to knitting and quilting classes at [facility] [employer]. (Cancelled during quarantine)  No history of vascular disease. No history of neuropathy, or nephropathy. Last eye exam was July 2019, no retinopathy, record reviewed.        Current Outpatient Medications Medication Sig    lancets (OneTouch UltraSoft Lancets) misc TEST TWICE DAILY    glucose blood VI test strips (OneTouch Ultra Blue Test Strip) strip USE TO TEST Twice DAily    metFORMIN ER (GLUCOPHAGE XR) 500 mg tablet 2  tablets with breakfast and 2 tablets with dinner (CHANGE IN DIRECTIONS)    canagliflozin (INVOKANA) 300 mg tablet take 1 tablet by mouth daily before BREAKFAST    PRICILLA PEN NEEDLE 32 gauge x 5/32\" ndle     liraglutide (VICTOZA) 0.6 mg/0.1 mL (18 mg/3 mL) pnij 1.8 mg by SubCUTAneous route daily.  nateglinide (STARLIX) 120 mg tablet Take 120 mg by mouth Before breakfast, lunch, and dinner.  lisinopril (PRINIVIL, ZESTRIL) 20 mg tablet Take  by mouth daily.  atorvastatin (LIPITOR) 40 mg tablet Take  by mouth daily.  escitalopram oxalate (LEXAPRO) 10 mg tablet Take 10 mg by mouth daily.  calcium-cholecalciferol, d3, (CALCIUM 600 + D) 600-125 mg-unit tab Take  by mouth two (2) times a day.  cholecalciferol, VITAMIN D3, (VITAMIN D3) 5,000 unit tab tablet Take  by mouth daily.  aspirin delayed-release 81 mg tablet Take  by mouth daily. No current facility-administered medications for this visit. Allergies   Allergen Reactions    Codeine Itching and Swelling     Review of Systems:  - Eyes: no blurry vision or double vision  - Cardiovascular: no chest pain  - Respiratory: no shortness of breath  - Musculoskeletal: no myalgias  - Neurological: no numbness/tingling in extremities    Physical Examination:  There were no vitals taken for this visit.   - GENERAL: NCAT, Appears well nourished   - EYES: EOMI, non-icteric, no proptosis   - Ear/Nose/Throat: NCAT, no visible inflammation or masses   - CARDIOVASCULAR: no cyanosis, no visible JVD   - RESPIRATORY: respiratory effort normal without any distress or labored breathing   - MUSCULOSKELETAL: Normal ROM of neck and upper extremities observed   - SKIN: No rash on face   - NEUROLOGIC:  No facial asymmetry (Cranial nerve 7 motor function), No gaze palsy   - PSYCHIATRIC: Normal affect, Normal insight and judgement         Data Reviewed:   None    Assessment/Plan:   1) DM > She reports her BGs have been higher during the quarantine and after she fractured her wrist. Pt encouraged to keep up the good work and continue the Nateglinide 120mg TID, Metformin 1000mg BID, Victoza 1.8mg daily and Invokana 300mg daily. For now will check an A1C and not make any changes at this time. Pt to check her BGs daily and mail her BG logs to me in 6 weeks. Pt voices understanding and agreement with the plan.     RTC 4 months      Copy sent to:  Dr. Yefri Brown

## 2020-06-01 RX ORDER — LIRAGLUTIDE 6 MG/ML
INJECTION SUBCUTANEOUS
Qty: 27 ML | Refills: 3 | Status: SHIPPED | OUTPATIENT
Start: 2020-06-01 | End: 2021-05-25

## 2020-06-16 RX ORDER — NATEGLINIDE 120 MG/1
120 TABLET ORAL
Qty: 270 TAB | Refills: 3 | Status: SHIPPED | OUTPATIENT
Start: 2020-06-16 | End: 2021-06-11

## 2020-08-25 LAB
ALBUMIN SERPL-MCNC: 4.4 G/DL (ref 3.7–4.7)
ALBUMIN/CREAT UR: 6 MG/G CREAT (ref 0–29)
ALBUMIN/GLOB SERPL: 2 {RATIO} (ref 1.2–2.2)
ALP SERPL-CCNC: 102 IU/L (ref 39–117)
ALT SERPL-CCNC: 26 IU/L (ref 0–32)
AST SERPL-CCNC: 18 IU/L (ref 0–40)
BILIRUB SERPL-MCNC: 0.5 MG/DL (ref 0–1.2)
BUN SERPL-MCNC: 15 MG/DL (ref 8–27)
BUN/CREAT SERPL: 28 (ref 12–28)
CALCIUM SERPL-MCNC: 9.6 MG/DL (ref 8.7–10.3)
CHLORIDE SERPL-SCNC: 97 MMOL/L (ref 96–106)
CHOLEST SERPL-MCNC: 148 MG/DL (ref 100–199)
CO2 SERPL-SCNC: 26 MMOL/L (ref 20–29)
CREAT SERPL-MCNC: 0.53 MG/DL (ref 0.57–1)
CREAT UR-MCNC: 54.7 MG/DL
EST. AVERAGE GLUCOSE BLD GHB EST-MCNC: 148 MG/DL
GLOBULIN SER CALC-MCNC: 2.2 G/DL (ref 1.5–4.5)
GLUCOSE SERPL-MCNC: 120 MG/DL (ref 65–99)
HBA1C MFR BLD: 6.8 % (ref 4.8–5.6)
HDLC SERPL-MCNC: 33 MG/DL
INTERPRETATION, 910389: NORMAL
LDLC SERPL CALC-MCNC: ABNORMAL MG/DL (ref 0–99)
Lab: NORMAL
MICROALBUMIN UR-MCNC: 3.5 UG/ML
POTASSIUM SERPL-SCNC: 4.2 MMOL/L (ref 3.5–5.2)
PROT SERPL-MCNC: 6.6 G/DL (ref 6–8.5)
SODIUM SERPL-SCNC: 138 MMOL/L (ref 134–144)
TRIGL SERPL-MCNC: 591 MG/DL (ref 0–149)
VLDLC SERPL CALC-MCNC: ABNORMAL MG/DL (ref 5–40)

## 2020-09-02 ENCOUNTER — VIRTUAL VISIT (OUTPATIENT)
Dept: ENDOCRINOLOGY | Age: 70
End: 2020-09-02
Payer: MEDICARE

## 2020-09-02 DIAGNOSIS — E78.2 MIXED HYPERLIPIDEMIA: ICD-10-CM

## 2020-09-02 DIAGNOSIS — I10 ESSENTIAL HYPERTENSION: ICD-10-CM

## 2020-09-02 DIAGNOSIS — E11.9 TYPE 2 DIABETES MELLITUS WITHOUT COMPLICATION, WITHOUT LONG-TERM CURRENT USE OF INSULIN (HCC): Primary | ICD-10-CM

## 2020-09-02 PROCEDURE — 3017F COLORECTAL CA SCREEN DOC REV: CPT | Performed by: INTERNAL MEDICINE

## 2020-09-02 PROCEDURE — G8399 PT W/DXA RESULTS DOCUMENT: HCPCS | Performed by: INTERNAL MEDICINE

## 2020-09-02 PROCEDURE — G8756 NO BP MEASURE DOC: HCPCS | Performed by: INTERNAL MEDICINE

## 2020-09-02 PROCEDURE — 99214 OFFICE O/P EST MOD 30 MIN: CPT | Performed by: INTERNAL MEDICINE

## 2020-09-02 PROCEDURE — 3044F HG A1C LEVEL LT 7.0%: CPT | Performed by: INTERNAL MEDICINE

## 2020-09-02 PROCEDURE — 2022F DILAT RTA XM EVC RTNOPTHY: CPT | Performed by: INTERNAL MEDICINE

## 2020-09-02 PROCEDURE — G8427 DOCREV CUR MEDS BY ELIG CLIN: HCPCS | Performed by: INTERNAL MEDICINE

## 2020-09-02 PROCEDURE — G8432 DEP SCR NOT DOC, RNG: HCPCS | Performed by: INTERNAL MEDICINE

## 2020-09-02 PROCEDURE — 1101F PT FALLS ASSESS-DOCD LE1/YR: CPT | Performed by: INTERNAL MEDICINE

## 2020-09-02 PROCEDURE — 1090F PRES/ABSN URINE INCON ASSESS: CPT | Performed by: INTERNAL MEDICINE

## 2020-09-02 NOTE — PROGRESS NOTES
Chief Complaint   Patient presents with    Diabetes     Doxy: [email or web address]    Other     Ph[facility]: [facility]   Records since last visit reviewed. **THIS IS A VIRTUAL VISIT VIA A VIDEO ENCOUNTER. PATIENT AGREED TO HAVE THEIR CARE DELIVERED OVER VIDEO IN PLACE OF THEIR REGULARLY SCHEDULED OFFICE VISIT**      History of Present Illness: Richard Fletcher is a 65 y.o. female here for follow up of diabetes. She was diagnosed with diabetes 10+ years. Her A1C last week was 6.8%. Pt is still taking the Nateglinide 120mg TID, Metformin 1000mg BID, Victoza 1.8mg daily and Invokana 300mg daily. Pt notes her wrist is healing well. \"I have not broken anything since\". She is checking her BGs every morning and her BGs have been running in the 140-200. She denies issues of hypoglycemia. She notes that with the quarantine her food choices have been changing to foods that will last longer and has been more carbs. She has not been as active either. A typical day is as follows:  Pt wakes around 8AM.  - breakfast: She has breakfast around 830AM, today she had cereal and milk. - She does not tend to have a mid-morning snack  - lunch: She has lunch around 1230PM, yesterday she had 4\" sub (ham and turkey), chips and water. - She has been cutting out the afternoon snack. - dinner: She has dinner around 6PM, last night she had chicken breast, pineapple on 1/2 bun and water. - She has been working on stopping the evening snacking, she will occasionally have a PB cracker. Exercise consists of housework. She she notes her knitting and quilting classes at [employer] today. No history of vascular disease. No history of neuropathy, or nephropathy. Last eye exam was August, 2020, no retinopathy, record reviewed.        Current Outpatient Medications   Medication Sig    nateglinide (STARLIX) 120 mg tablet Take 1 Tab by mouth Before breakfast, lunch, and dinner.  Victoza 3-Alcon 0.6 mg/0.1 mL (18 mg/3 mL) pnij INJECT 1.8 MG UNDER THE SKIN DAILY    lancets (OneTouch UltraSoft Lancets) misc TEST TWICE DAILY    glucose blood VI test strips (OneTouch Ultra Blue Test Strip) strip USE TO TEST Twice DAily    metFORMIN ER (GLUCOPHAGE XR) 500 mg tablet 2  tablets with breakfast and 2 tablets with dinner (CHANGE IN DIRECTIONS)    canagliflozin (INVOKANA) 300 mg tablet take 1 tablet by mouth daily before BREAKFAST    PRICILLA PEN NEEDLE 32 gauge x 5/32\" ndle     lisinopril (PRINIVIL, ZESTRIL) 20 mg tablet Take  by mouth daily.  atorvastatin (LIPITOR) 40 mg tablet Take  by mouth daily.  escitalopram oxalate (LEXAPRO) 10 mg tablet Take 10 mg by mouth daily.  calcium-cholecalciferol, d3, (CALCIUM 600 + D) 600-125 mg-unit tab Take  by mouth two (2) times a day.  cholecalciferol, VITAMIN D3, (VITAMIN D3) 5,000 unit tab tablet Take  by mouth daily.  aspirin delayed-release 81 mg tablet Take  by mouth daily. No current facility-administered medications for this visit. Allergies   Allergen Reactions    Codeine Itching and Swelling     Review of Systems:  - Eyes: no blurry vision or double vision  - Cardiovascular: no chest pain  - Respiratory: no shortness of breath  - Musculoskeletal: no myalgias  - Neurological: no numbness/tingling in extremities    Physical Examination:  There were no vitals taken for this visit.   - GENERAL: NCAT, Appears well nourished   - EYES: EOMI, non-icteric, no proptosis   - Ear/Nose/Throat: NCAT, no visible inflammation or masses   - CARDIOVASCULAR: no cyanosis, no visible JVD   - RESPIRATORY: respiratory effort normal without any distress or labored breathing   - MUSCULOSKELETAL: Normal ROM of neck and upper extremities observed   - SKIN: No rash on face   - NEUROLOGIC:  No facial asymmetry (Cranial nerve 7 motor function), No gaze palsy   - PSYCHIATRIC: Normal affect, Normal insight and judgement         Data Reviewed: Component      Latest Ref Rng & Units 8/24/2020 8/24/2020 8/24/2020 8/24/2020           9:55 AM  9:55 AM  9:55 AM  9:55 AM   Glucose      65 - 99 mg/dL 120 (H)      BUN      8 - 27 mg/dL 15      Creatinine      0.57 - 1.00 mg/dL 0.53 (L)      GFR est non-AA      >59 mL/min/1.73 95      GFR est AA      >59 mL/min/1.73 110      BUN/Creatinine ratio      12 - 28 28      Sodium      134 - 144 mmol/L 138      Potassium      3.5 - 5.2 mmol/L 4.2      Chloride      96 - 106 mmol/L 97      CO2      20 - 29 mmol/L 26      Calcium      8.7 - 10.3 mg/dL 9.6      Protein, total      6.0 - 8.5 g/dL 6.6      Albumin      3.7 - 4.7 g/dL 4.4      GLOBULIN, TOTAL      1.5 - 4.5 g/dL 2.2      A-G Ratio      1.2 - 2.2 2.0      Bilirubin, total      0.0 - 1.2 mg/dL 0.5      Alk. phosphatase      39 - 117 IU/L 102      AST      0 - 40 IU/L 18      ALT      0 - 32 IU/L 26      Cholesterol, total      100 - 199 mg/dL  148     Triglyceride      0 - 149 mg/dL  591 (HH)     HDL Cholesterol      >39 mg/dL  33 (L)     VLDL, calculated      5 - 40 mg/dL  Comment     LDL, calculated      0 - 99 mg/dL  Comment     Creatinine, urine      Not Estab. mg/dL   54.7    Microalbumin, urine      Not Estab. ug/mL   3.5    Microalbumin/Creat. Ratio      0 - 29 mg/g creat   6    Hemoglobin A1c, (calculated)      4.8 - 5.6 %    6.8 (H)   Estimated average glucose      mg/dL    148       Assessment/Plan:   1) DM > Her A1C last week was 6.8%. Pt encouraged to keep up the good work and continue the Nateglinide 120mg TID, Metformin 1000mg BID, Victoza 1.8mg daily and Invokana 300mg daily. Pt to check her BGs daily. 2) HTN > Pt is on an ACEI for renal protection. Will not make any changes at this time. 3) HLD > Her TC and Non-HLD were at goal in August 2020. Pt to continue her Atorvastatin 40mg daily. Her TG were high, but notes she was not fasting. Pt voices understanding and agreement with the plan.     RTC 4 months      Copy sent to:  Dr. Fransico Alexandre Heatwole

## 2020-09-02 NOTE — PROGRESS NOTES
Lab Results   Component Value Date/Time    Hemoglobin A1c 6.8 (H) 08/24/2020 09:55 AM    Hemoglobin A1c 7.7 (H) 01/21/2019 02:33 PM     Lab Results   Component Value Date/Time    Cholesterol, total 148 08/24/2020 09:55 AM    HDL Cholesterol 33 (L) 08/24/2020 09:55 AM    LDL, calculated Comment 08/24/2020 09:55 AM    VLDL, calculated Comment 08/24/2020 09:55 AM    Triglyceride 591 (HH) 08/24/2020 09:55 AM     Lab Results   Component Value Date/Time    Microalb/Creat ratio (ug/mg creat.) 6 08/24/2020 09:55 AM     Lab Results   Component Value Date/Time    Sodium 138 08/24/2020 09:55 AM    Potassium 4.2 08/24/2020 09:55 AM    Chloride 97 08/24/2020 09:55 AM    CO2 26 08/24/2020 09:55 AM    Glucose 120 (H) 08/24/2020 09:55 AM    BUN 15 08/24/2020 09:55 AM    Creatinine 0.53 (L) 08/24/2020 09:55 AM    BUN/Creatinine ratio 28 08/24/2020 09:55 AM    GFR est  08/24/2020 09:55 AM    GFR est non-AA 95 08/24/2020 09:55 AM    Calcium 9.6 08/24/2020 09:55 AM    Bilirubin, total 0.5 08/24/2020 09:55 AM    Alk.  phosphatase 102 08/24/2020 09:55 AM    Protein, total 6.6 08/24/2020 09:55 AM    Albumin 4.4 08/24/2020 09:55 AM    A-G Ratio 2.0 08/24/2020 09:55 AM    ALT (SGPT) 26 08/24/2020 09:55 AM    AST (SGOT) 18 08/24/2020 09:55 AM

## 2021-01-12 ENCOUNTER — VIRTUAL VISIT (OUTPATIENT)
Dept: ENDOCRINOLOGY | Age: 71
End: 2021-01-12
Payer: MEDICARE

## 2021-01-12 DIAGNOSIS — E11.9 TYPE 2 DIABETES MELLITUS WITHOUT COMPLICATION, WITHOUT LONG-TERM CURRENT USE OF INSULIN (HCC): Primary | ICD-10-CM

## 2021-01-12 DIAGNOSIS — E78.2 MIXED HYPERLIPIDEMIA: ICD-10-CM

## 2021-01-12 DIAGNOSIS — I10 ESSENTIAL HYPERTENSION: ICD-10-CM

## 2021-01-12 PROCEDURE — 3017F COLORECTAL CA SCREEN DOC REV: CPT | Performed by: INTERNAL MEDICINE

## 2021-01-12 PROCEDURE — G8427 DOCREV CUR MEDS BY ELIG CLIN: HCPCS | Performed by: INTERNAL MEDICINE

## 2021-01-12 PROCEDURE — 99214 OFFICE O/P EST MOD 30 MIN: CPT | Performed by: INTERNAL MEDICINE

## 2021-01-12 PROCEDURE — 1090F PRES/ABSN URINE INCON ASSESS: CPT | Performed by: INTERNAL MEDICINE

## 2021-01-12 PROCEDURE — G8432 DEP SCR NOT DOC, RNG: HCPCS | Performed by: INTERNAL MEDICINE

## 2021-01-12 PROCEDURE — 1101F PT FALLS ASSESS-DOCD LE1/YR: CPT | Performed by: INTERNAL MEDICINE

## 2021-01-12 PROCEDURE — G8536 NO DOC ELDER MAL SCRN: HCPCS | Performed by: INTERNAL MEDICINE

## 2021-01-12 PROCEDURE — 3046F HEMOGLOBIN A1C LEVEL >9.0%: CPT | Performed by: INTERNAL MEDICINE

## 2021-01-12 PROCEDURE — 2022F DILAT RTA XM EVC RTNOPTHY: CPT | Performed by: INTERNAL MEDICINE

## 2021-01-12 PROCEDURE — G8399 PT W/DXA RESULTS DOCUMENT: HCPCS | Performed by: INTERNAL MEDICINE

## 2021-01-12 PROCEDURE — G8756 NO BP MEASURE DOC: HCPCS | Performed by: INTERNAL MEDICINE

## 2021-01-12 PROCEDURE — G8419 CALC BMI OUT NRM PARAM NOF/U: HCPCS | Performed by: INTERNAL MEDICINE

## 2021-01-12 RX ORDER — CHOLESTYRAMINE 4 G/5.5G
POWDER, FOR SUSPENSION ORAL
COMMUNITY
Start: 2020-11-19

## 2021-01-12 RX ORDER — ANASTROZOLE 1 MG/1
TABLET ORAL
COMMUNITY
Start: 2020-12-03

## 2021-01-12 RX ORDER — LISINOPRIL 10 MG/1
TABLET ORAL DAILY
COMMUNITY
Start: 2020-11-04

## 2021-01-12 NOTE — PROGRESS NOTES
Chief Complaint   Patient presents with    Diabetes     EMAIL [email or web address]   Records since last visit reviewed. **THIS IS A VIRTUAL VISIT VIA A VIDEO ENCOUNTER. PATIENT AGREED TO HAVE THEIR CARE DELIVERED OVER VIDEO IN PLACE OF THEIR REGULARLY SCHEDULED OFFICE VISIT**      History of Present Illness: Aroldo Canchola is a 65 y.o. female here for follow up of diabetes. She was diagnosed with diabetes 10+ years. At our last visit in August 2020 her A1C was  6.8%. Pt encouraged to keep up the good work and continue the Nateglinide 120mg TID, Metformin 1000mg BID, Victoza 1.8mg daily and Invokana 300mg daily. Pt notes he had two breast biopsy and one came back \"not good\" and she needed surgery. 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 She was referred oncology and she was started Anastrazole daily. She had a colonoscopy which showed nothing concerning. Sundeep Alva She was told that since she did not have a gall bladder that was the cause of her diarrhea. She has been started on prevealyte BID and that has helped resolve her post-prandial diarrhea. She has not had any falls or fractures. Pt is still taking the Nateglinide 120mg TID, Metformin 1000mg BID, Victoza 1.8mg daily and Invokana 300mg daily. She is checking her BGs every morning and her BGs have been running in the 130-190. She denies issues of hypoglycemia. She notes that with the quarantine her food choices have been changing to foods that will last longer and has been more carbs. She has not been as active either. A typical day is as follows:  - She has breakfast around 8AM, today she had low carb bread, sausage and water. - She does not tend to have a mid-morning snack  - She has lunch around 1230PM, yesterday she had chicken salad and 4 crackers and water.     - She has been cutting out the afternoon snack.  - She has dinner around 6PM, last night she had fried chicken, greens, tomatoes, mashed potatoes and water. chicken breast, pineapple on 1/2 bun and water. Exercise consists of housework. No history of vascular disease. No history of neuropathy, or nephropathy. Last eye exam was August, 2020, no retinopathy, record reviewed. Current Outpatient Medications   Medication Sig    lisinopriL (PRINIVIL, ZESTRIL) 10 mg tablet     anastrozole (ARIMIDEX) 1 mg tablet TK 1 T PO QD    Prevalite 4 gram packet 1/2 pack BID    nateglinide (STARLIX) 120 mg tablet Take 1 Tab by mouth Before breakfast, lunch, and dinner.  Victoza 3-Alcon 0.6 mg/0.1 mL (18 mg/3 mL) pnij INJECT 1.8 MG UNDER THE SKIN DAILY    metFORMIN ER (GLUCOPHAGE XR) 500 mg tablet 2  tablets with breakfast and 2 tablets with dinner (CHANGE IN DIRECTIONS)    canagliflozin (INVOKANA) 300 mg tablet take 1 tablet by mouth daily before BREAKFAST    atorvastatin (LIPITOR) 40 mg tablet Take  by mouth daily.  escitalopram oxalate (LEXAPRO) 10 mg tablet Take 10 mg by mouth daily.  calcium-cholecalciferol, d3, (CALCIUM 600 + D) 600-125 mg-unit tab Take  by mouth two (2) times a day.  cholecalciferol, VITAMIN D3, (VITAMIN D3) 5,000 unit tab tablet Take  by mouth daily.  aspirin delayed-release 81 mg tablet Take  by mouth daily.  lancets (OneTouch UltraSoft Lancets) misc TEST TWICE DAILY    glucose blood VI test strips (OneTouch Ultra Blue Test Strip) strip USE TO TEST Twice DAily    PRICILLA PEN NEEDLE 32 gauge x 5/32\" ndle      No current facility-administered medications for this visit. Allergies   Allergen Reactions    Codeine Itching and Swelling     Review of Systems:  - Eyes: no blurry vision or double vision  - Cardiovascular: no chest pain  - Respiratory: no shortness of breath  - Musculoskeletal: no myalgias  - Neurological: no numbness/tingling in extremities    Physical Examination:  There were no vitals taken for this visit.   - GENERAL: NCAT, Appears well nourished   - EYES: EOMI, non-icteric, no proptosis   - Ear/Nose/Throat: NCAT, no visible inflammation or masses   - CARDIOVASCULAR: no cyanosis, no visible JVD   - RESPIRATORY: respiratory effort normal without any distress or labored breathing   - MUSCULOSKELETAL: Normal ROM of neck and upper extremities observed   - SKIN: No rash on face   - NEUROLOGIC:  No facial asymmetry (Cranial nerve 7 motor function), No gaze palsy   - PSYCHIATRIC: Normal affect, Normal insight and judgement         Data Reviewed:   None    Assessment/Plan:   1) DM > Pt reports her BGs are running in a good range. Will order an A1C. Pt encouraged to keep up the good work and continue the Nateglinide 120mg TID, Metformin 1000mg BID, Victoza 1.8mg daily and Invokana 300mg daily.  Pt to check her BGs daily.    2) HTN > Pt is on an ACEI for renal protection. Will not make any changes at this time. Will order a urine MA.    3) HLD > Her TC and Non-HLD were at goal in August 2020. Pt to continue her Atorvastatin 40mg daily.  Her TG were high, but notes she was not fasting.    Pt voices understanding and agreement with the plan.    RTC 6 months      Copy sent to:  Dr. Kaiser Ariza

## 2021-01-15 LAB
ALBUMIN/CREAT UR: <7 MG/G CREAT (ref 0–29)
CREAT UR-MCNC: 42.1 MG/DL
EST. AVERAGE GLUCOSE BLD GHB EST-MCNC: 140 MG/DL
HBA1C MFR BLD: 6.5 % (ref 4.8–5.6)
MICROALBUMIN UR-MCNC: <3 UG/ML

## 2021-03-13 RX ORDER — METFORMIN HYDROCHLORIDE 500 MG/1
TABLET, EXTENDED RELEASE ORAL
Qty: 360 TAB | Refills: 0 | Status: SHIPPED | OUTPATIENT
Start: 2021-03-13 | End: 2021-06-11

## 2021-05-25 RX ORDER — LIRAGLUTIDE 6 MG/ML
INJECTION SUBCUTANEOUS
Qty: 27 ML | Refills: 3 | Status: SHIPPED | OUTPATIENT
Start: 2021-05-25 | End: 2022-01-10 | Stop reason: DRUGHIGH

## 2021-06-11 RX ORDER — NATEGLINIDE 120 MG/1
TABLET ORAL
Qty: 270 TABLET | Refills: 3 | Status: SHIPPED | OUTPATIENT
Start: 2021-06-11 | End: 2022-05-16

## 2021-06-11 RX ORDER — METFORMIN HYDROCHLORIDE 500 MG/1
TABLET, EXTENDED RELEASE ORAL
Qty: 360 TABLET | Refills: 3 | Status: SHIPPED | OUTPATIENT
Start: 2021-06-11 | End: 2022-01-10 | Stop reason: DRUGHIGH

## 2021-07-02 DIAGNOSIS — E11.9 TYPE 2 DIABETES MELLITUS WITHOUT COMPLICATION, WITHOUT LONG-TERM CURRENT USE OF INSULIN (HCC): Primary | ICD-10-CM

## 2021-07-02 RX ORDER — LANCETS
EACH MISCELLANEOUS
Qty: 200 EACH | Refills: 0 | Status: SHIPPED | OUTPATIENT
Start: 2021-07-02

## 2021-07-14 ENCOUNTER — OFFICE VISIT (OUTPATIENT)
Dept: ENDOCRINOLOGY | Age: 71
End: 2021-07-14
Payer: MEDICARE

## 2021-07-14 VITALS
HEART RATE: 95 BPM | DIASTOLIC BLOOD PRESSURE: 62 MMHG | HEIGHT: 64 IN | SYSTOLIC BLOOD PRESSURE: 117 MMHG | WEIGHT: 182.8 LBS | BODY MASS INDEX: 31.21 KG/M2

## 2021-07-14 DIAGNOSIS — E11.9 TYPE 2 DIABETES MELLITUS WITHOUT COMPLICATION, WITHOUT LONG-TERM CURRENT USE OF INSULIN (HCC): Primary | ICD-10-CM

## 2021-07-14 DIAGNOSIS — I10 ESSENTIAL HYPERTENSION: ICD-10-CM

## 2021-07-14 DIAGNOSIS — E78.2 MIXED HYPERLIPIDEMIA: ICD-10-CM

## 2021-07-14 LAB — HBA1C MFR BLD HPLC: 6.7 %

## 2021-07-14 PROCEDURE — G8752 SYS BP LESS 140: HCPCS | Performed by: INTERNAL MEDICINE

## 2021-07-14 PROCEDURE — G8754 DIAS BP LESS 90: HCPCS | Performed by: INTERNAL MEDICINE

## 2021-07-14 PROCEDURE — 2022F DILAT RTA XM EVC RTNOPTHY: CPT | Performed by: INTERNAL MEDICINE

## 2021-07-14 PROCEDURE — 99214 OFFICE O/P EST MOD 30 MIN: CPT | Performed by: INTERNAL MEDICINE

## 2021-07-14 PROCEDURE — G8399 PT W/DXA RESULTS DOCUMENT: HCPCS | Performed by: INTERNAL MEDICINE

## 2021-07-14 PROCEDURE — 83036 HEMOGLOBIN GLYCOSYLATED A1C: CPT | Performed by: INTERNAL MEDICINE

## 2021-07-14 PROCEDURE — 3017F COLORECTAL CA SCREEN DOC REV: CPT | Performed by: INTERNAL MEDICINE

## 2021-07-14 PROCEDURE — G8417 CALC BMI ABV UP PARAM F/U: HCPCS | Performed by: INTERNAL MEDICINE

## 2021-07-14 PROCEDURE — 1090F PRES/ABSN URINE INCON ASSESS: CPT | Performed by: INTERNAL MEDICINE

## 2021-07-14 PROCEDURE — 1101F PT FALLS ASSESS-DOCD LE1/YR: CPT | Performed by: INTERNAL MEDICINE

## 2021-07-14 PROCEDURE — G8427 DOCREV CUR MEDS BY ELIG CLIN: HCPCS | Performed by: INTERNAL MEDICINE

## 2021-07-14 PROCEDURE — 3044F HG A1C LEVEL LT 7.0%: CPT | Performed by: INTERNAL MEDICINE

## 2021-07-14 PROCEDURE — G8536 NO DOC ELDER MAL SCRN: HCPCS | Performed by: INTERNAL MEDICINE

## 2021-07-14 PROCEDURE — G8432 DEP SCR NOT DOC, RNG: HCPCS | Performed by: INTERNAL MEDICINE

## 2021-07-14 NOTE — PROGRESS NOTES
Chief Complaint   Patient presents with    Diabetes     pcp and pharmacy verified   Records since last visit reviewed. History of Present Illness: Kate Nunez is a 66 y.o. female here for follow up of diabetes. She was diagnosed with diabetes 10+ years. At our last visit in January 2021 her A1C was  6.5%. Pt encouraged to keep up the good work and continue the Nateglinide 120mg TID, Metformin 1000mg BID, Victoza 1.8mg daily and Invokana 300mg daily. Pt notes he had two breast biopsy and one came back \"not good\" and she needed surgery. 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 She was referred oncology and she was started Anastrazole daily. She had a colonoscopy which showed nothing concerning. Sueellen Payment She was told that since she did not have a gall bladder that was the cause of her diarrhea. She has been started on prevealyte BID and that has helped resolve her post-prandial diarrhea. She has not had any falls or fractures. Pt denies any recent illnesses, injuries or hospitalizations. Pt received the J&J COVID vaccinations. She is still taking the Anastrazole    Her A1C today was 6.7%. Pt is still taking the Nateglinide 120mg TID, Metformin 1000mg BID, Victoza 1.8mg daily and Invokana 300mg daily. She is checking her BGs every morning and her BGs have been running in the 130-190. She denies issues of hypoglycemia. A typical day is as follows:  - She has breakfast around 8AM, today she had toast, blackberry preserves and water. - She does not tend to have a mid-morning snack  - She has lunch around 1230PM, yesterday she had 3\" turkey sub, no side items and water. - She has dinner around 6PM, last night she had string beans, tomatoes, cucumbers and water. Exercise consists of housework. Sewing classes have restarted. No history of vascular disease. No history of neuropathy, or nephropathy.       Last eye exam was July 2021, no retinopathy, record reviewed. Current Outpatient Medications   Medication Sig    OneTouch UltraSoft Lancets misc TEST TWICE DAILY    OneTouch Ultra Blue Test Strip strip TEST TWICE DAILY    metFORMIN ER (GLUCOPHAGE XR) 500 mg tablet TAKE 2 TABLETS WITH BREAKFAST AND 2 TABLETS WITH DINNER (DOSE CHANGE)    nateglinide (STARLIX) 120 mg tablet TAKE 1 TABLET BEFORE BREAKFAST LUNCH AND DINNER    Victoza 3-Alcon 0.6 mg/0.1 mL (18 mg/3 mL) pnij INJECT 1.8 MG UNDER THE SKIN DAILY    canagliflozin (Invokana) 300 mg tablet TAKE 1 TABLET DAILY BEFORE BREAKFAST    lisinopriL (PRINIVIL, ZESTRIL) 10 mg tablet Take  by mouth daily.  anastrozole (ARIMIDEX) 1 mg tablet TK 1 T PO QD    Prevalite 4 gram packet 1/2 pack BID    PRICILLA PEN NEEDLE 32 gauge x 5/32\" ndle     atorvastatin (LIPITOR) 40 mg tablet Take  by mouth daily.  escitalopram oxalate (LEXAPRO) 10 mg tablet Take 10 mg by mouth daily.  calcium-cholecalciferol, d3, (CALCIUM 600 + D) 600-125 mg-unit tab Take  by mouth two (2) times a day.  cholecalciferol, VITAMIN D3, (VITAMIN D3) 5,000 unit tab tablet Take  by mouth daily.  aspirin delayed-release 81 mg tablet Take  by mouth daily. No current facility-administered medications for this visit. Allergies   Allergen Reactions    Codeine Itching and Swelling     Review of Systems:  - Eyes: no blurry vision or double vision  - Cardiovascular: no chest pain  - Respiratory: no shortness of breath  - Musculoskeletal: no myalgias  - Neurological: no numbness/tingling in extremities    Physical Examination:  Blood pressure 117/62, pulse 95, height 5' 4\" (1.626 m), weight 182 lb 12.8 oz (82.9 kg).   - General: pleasant, no distress, good eye contact   - Neck: no carotid bruits  - Cardiovascular: regular, normal rate, nl s1 and s2, no m/r/g, 2+ DP pulses   - Respiratory: clear bilaterally  - Integumentary: no edema, no foot ulcers  - Psychiatric: normal mood and affect    Diabetic foot exam:     Left Foot:   Visual Exam: normal    Pulse DP: 2+ (normal)   Filament test: normal sensation    Vibratory sensation: normal      Right Foot:   Visual Exam: normal    Pulse DP: 2+ (normal)   Filament test: normal sensation    Vibratory sensation: normal          Data Reviewed:   Her A1C today was 6.7%. Assessment/Plan:   1) DM > Her A1C today was 6.7%. Pt reports her BGs are running in a good range. Pt encouraged to keep up the good work and continue the Nateglinide 120mg TID, Metformin 1000mg BID, Victoza 1.8mg daily and Invokana 300mg daily. Pt to check her BGs daily. 2) HTN > Her BP today was 117/62. Pt is on an ACEI for renal protection. Will not make any changes at this time. 3) HLD > Her TC and Non-HLD were at goal in August 2020. Pt to continue her Atorvastatin 40mg daily. Will order fasting lipid and CMP for our next visit. Pt voices understanding and agreement with the plan.     RTC 6 months      Copy sent to:  Dr. Trinidad Vicente

## 2021-11-02 ENCOUNTER — TELEPHONE (OUTPATIENT)
Dept: ENDOCRINOLOGY | Age: 71
End: 2021-11-02

## 2021-11-02 NOTE — TELEPHONE ENCOUNTER
----- Message from Christa Davis sent at 11/2/2021 10:30 AM EDT -----  Regarding: Dr. Davis Bones: [phone]  General Message/Vendor Calls    Caller's first and last name: N/A      Reason for call: Requesting to choose alternative for canagliflozin (Invokana) 300 mg tablet, due to [facility] stating it is no longer a preferred medication. Callback required yes/no and why: yes/follow up       Best contact number(s): [phone]      Details to clarify the request: Insurance gave PT the following to choose from: Sharen Kawasaki, [employer], [place], [place], [employer] XR.        Christa Davis

## 2022-01-01 DIAGNOSIS — E11.9 TYPE 2 DIABETES MELLITUS WITHOUT COMPLICATION, WITHOUT LONG-TERM CURRENT USE OF INSULIN (HCC): ICD-10-CM

## 2022-01-05 LAB
ALBUMIN SERPL-MCNC: 4.4 G/DL (ref 3.7–4.7)
ALBUMIN/CREAT UR: 22 MG/G CREAT (ref 0–29)
ALBUMIN/GLOB SERPL: 2.1 {RATIO} (ref 1.2–2.2)
ALP SERPL-CCNC: 100 IU/L (ref 44–121)
ALT SERPL-CCNC: 27 IU/L (ref 0–32)
AST SERPL-CCNC: 21 IU/L (ref 0–40)
BILIRUB SERPL-MCNC: 0.6 MG/DL (ref 0–1.2)
BUN SERPL-MCNC: 14 MG/DL (ref 8–27)
BUN/CREAT SERPL: 26 (ref 12–28)
CALCIUM SERPL-MCNC: 9.7 MG/DL (ref 8.7–10.3)
CHLORIDE SERPL-SCNC: 100 MMOL/L (ref 96–106)
CHOLEST SERPL-MCNC: 142 MG/DL (ref 100–199)
CO2 SERPL-SCNC: 27 MMOL/L (ref 20–29)
CREAT SERPL-MCNC: 0.53 MG/DL (ref 0.57–1)
CREAT UR-MCNC: 45.4 MG/DL
EST. AVERAGE GLUCOSE BLD GHB EST-MCNC: 157 MG/DL
GLOBULIN SER CALC-MCNC: 2.1 G/DL (ref 1.5–4.5)
GLUCOSE SERPL-MCNC: 104 MG/DL (ref 65–99)
HBA1C MFR BLD: 7.1 % (ref 4.8–5.6)
HDLC SERPL-MCNC: 34 MG/DL
IMP & REVIEW OF LAB RESULTS: NORMAL
LDLC SERPL CALC-MCNC: 45 MG/DL (ref 0–99)
MICROALBUMIN UR-MCNC: 10.1 UG/ML
POTASSIUM SERPL-SCNC: 3.8 MMOL/L (ref 3.5–5.2)
PROT SERPL-MCNC: 6.5 G/DL (ref 6–8.5)
SODIUM SERPL-SCNC: 141 MMOL/L (ref 134–144)
TRIGL SERPL-MCNC: 431 MG/DL (ref 0–149)
VLDLC SERPL CALC-MCNC: 63 MG/DL (ref 5–40)

## 2022-01-10 ENCOUNTER — OFFICE VISIT (OUTPATIENT)
Dept: ENDOCRINOLOGY | Age: 72
End: 2022-01-10
Payer: MEDICARE

## 2022-01-10 VITALS
HEART RATE: 84 BPM | BODY MASS INDEX: 31.92 KG/M2 | WEIGHT: 187 LBS | DIASTOLIC BLOOD PRESSURE: 70 MMHG | HEIGHT: 64 IN | SYSTOLIC BLOOD PRESSURE: 122 MMHG

## 2022-01-10 DIAGNOSIS — I10 ESSENTIAL HYPERTENSION: ICD-10-CM

## 2022-01-10 DIAGNOSIS — E78.2 MIXED HYPERLIPIDEMIA: ICD-10-CM

## 2022-01-10 DIAGNOSIS — E11.9 TYPE 2 DIABETES MELLITUS WITHOUT COMPLICATION, WITHOUT LONG-TERM CURRENT USE OF INSULIN (HCC): Primary | ICD-10-CM

## 2022-01-10 PROCEDURE — 3051F HG A1C>EQUAL 7.0%<8.0%: CPT | Performed by: INTERNAL MEDICINE

## 2022-01-10 PROCEDURE — G8399 PT W/DXA RESULTS DOCUMENT: HCPCS | Performed by: INTERNAL MEDICINE

## 2022-01-10 PROCEDURE — G8754 DIAS BP LESS 90: HCPCS | Performed by: INTERNAL MEDICINE

## 2022-01-10 PROCEDURE — G8536 NO DOC ELDER MAL SCRN: HCPCS | Performed by: INTERNAL MEDICINE

## 2022-01-10 PROCEDURE — G8432 DEP SCR NOT DOC, RNG: HCPCS | Performed by: INTERNAL MEDICINE

## 2022-01-10 PROCEDURE — G8427 DOCREV CUR MEDS BY ELIG CLIN: HCPCS | Performed by: INTERNAL MEDICINE

## 2022-01-10 PROCEDURE — G8752 SYS BP LESS 140: HCPCS | Performed by: INTERNAL MEDICINE

## 2022-01-10 PROCEDURE — 3017F COLORECTAL CA SCREEN DOC REV: CPT | Performed by: INTERNAL MEDICINE

## 2022-01-10 PROCEDURE — 1101F PT FALLS ASSESS-DOCD LE1/YR: CPT | Performed by: INTERNAL MEDICINE

## 2022-01-10 PROCEDURE — 2022F DILAT RTA XM EVC RTNOPTHY: CPT | Performed by: INTERNAL MEDICINE

## 2022-01-10 PROCEDURE — 99214 OFFICE O/P EST MOD 30 MIN: CPT | Performed by: INTERNAL MEDICINE

## 2022-01-10 PROCEDURE — G8417 CALC BMI ABV UP PARAM F/U: HCPCS | Performed by: INTERNAL MEDICINE

## 2022-01-10 PROCEDURE — 1090F PRES/ABSN URINE INCON ASSESS: CPT | Performed by: INTERNAL MEDICINE

## 2022-01-10 NOTE — PROGRESS NOTES
No chief complaint on file. Records since last visit reviewed. History of Present Illness: Bee Gonsalez is a 66 y.o. female here for follow up of diabetes. She was diagnosed with diabetes 10+ years. Pt notes he had two breast biopsy and one came back \"not good\" and she needed surgery. 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 She was referred oncology and she was started Anastrazole daily. She had a colonoscopy which showed nothing concerning. Caro Cancholajana She was told that since she did not have a gall bladder that was the cause of her diarrhea. She has been started on prevealyte BID and that has helped resolve her post-prandial diarrhea. At our last visit in July 2021 her A1C was  6.57. Pt encouraged to keep up the good work and continue the Nateglinide 120mg TID, Metformin 1000mg BID, Victoza 1.8mg daily and Invokana 300mg daily. Since that time we had to change her Invokana 300mg daily to Jardiane 25mg daily due to formulary requirements. She has not had any falls or fractures. Pt denies any recent illnesses, injuries or hospitalizations. Pt received the [employer] COVID vaccination and Hyperpia booster. She is still taking the Anastrazole    Pt is still taking the Nateglinide 120mg TID, Metformin 1000mg BID, Victoza 1.8mg daily and Jardiance 25mg daily. Her A1C last week was 7.1%. She is checking her BGs every morning and her BGs have been running in the 120-170. She denies issues of hypoglycemia. A typical day is as follows:  - She has breakfast around 8AM, today she had a banana and water. - She does not tend to have a mid-morning snack  - She has lunch around 1230PM, yesterday she had tuna on crackers and flavored water. - She has dinner around 6PM, last night she had string beans, potatoes, ribs and water. Exercise consists of housework.  Sewing classes had restarted, but again stopped due to the increased COVID numbers. No history of vascular disease. No history of neuropathy, or nephropathy. Last eye exam was July 2021, no retinopathy, record reviewed. Current Outpatient Medications   Medication Sig    empagliflozin (JARDIANCE) 25 mg tablet Take 1 Tablet by mouth daily.  OneTouch UltraSoft Lancets misc TEST TWICE DAILY    OneTouch Ultra Blue Test Strip strip TEST TWICE DAILY    metFORMIN ER (GLUCOPHAGE XR) 500 mg tablet TAKE 2 TABLETS WITH BREAKFAST AND 2 TABLETS WITH DINNER (DOSE CHANGE)    nateglinide (STARLIX) 120 mg tablet TAKE 1 TABLET BEFORE BREAKFAST LUNCH AND DINNER    Victoza 3-Alcon 0.6 mg/0.1 mL (18 mg/3 mL) pnij INJECT 1.8 MG UNDER THE SKIN DAILY    lisinopriL (PRINIVIL, ZESTRIL) 10 mg tablet Take  by mouth daily.  anastrozole (ARIMIDEX) 1 mg tablet TK 1 T PO QD    Prevalite 4 gram packet 1/2 pack BID    PRICILLA PEN NEEDLE 32 gauge x 5/32\" ndle     atorvastatin (LIPITOR) 40 mg tablet Take  by mouth daily.  escitalopram oxalate (LEXAPRO) 10 mg tablet Take 10 mg by mouth daily.  calcium-cholecalciferol, d3, (CALCIUM 600 + D) 600-125 mg-unit tab Take  by mouth two (2) times a day.  cholecalciferol, VITAMIN D3, (VITAMIN D3) 5,000 unit tab tablet Take  by mouth daily.  aspirin delayed-release 81 mg tablet Take  by mouth daily. No current facility-administered medications for this visit. Allergies   Allergen Reactions    Codeine Itching and Swelling     Review of Systems:  - Eyes: no blurry vision or double vision  - Cardiovascular: no chest pain  - Respiratory: no shortness of breath  - Musculoskeletal: no myalgias  - Neurological: no numbness/tingling in extremities    Physical Examination:  There were no vitals taken for this visit.   - General: pleasant, no distress, good eye contact   - Neck: no carotid bruits  - Cardiovascular: regular, normal rate, nl s1 and s2, no m/r/g, 2+ DP pulses   - Respiratory: clear bilaterally  - Integumentary: no edema, no foot ulcers  - Psychiatric: normal mood and affect    Diabetic foot exam:     Left Foot:   Visual Exam: normal    Pulse DP: 2+ (normal)   Filament test: normal sensation    Vibratory sensation: normal      Right Foot:   Visual Exam: normal    Pulse DP: 2+ (normal)   Filament test: normal sensation    Vibratory sensation: normal          Data Reviewed:   Component      Latest Ref Rng & Units 1/4/2022   Glucose      65 - 99 mg/dL 104 (H)   BUN      8 - 27 mg/dL 14   Creatinine      0.57 - 1.00 mg/dL 0.53 (L)   GFR est non-AA      >59 mL/min/1.73 94   GFR est AA      >59 mL/min/1.73 109   BUN/Creatinine ratio      12 - 28 26   Sodium      134 - 144 mmol/L 141   Potassium      3.5 - 5.2 mmol/L 3.8   Chloride      96 - 106 mmol/L 100   CO2      20 - 29 mmol/L 27   Calcium      8.7 - 10.3 mg/dL 9.7   Protein, total      6.0 - 8.5 g/dL 6.5   Albumin      3.7 - 4.7 g/dL 4.4   GLOBULIN, TOTAL      1.5 - 4.5 g/dL 2.1   A-G Ratio      1.2 - 2.2 2.1   Bilirubin, total      0.0 - 1.2 mg/dL 0.6   Alk. phosphatase      44 - 121 IU/L 100   AST      0 - 40 IU/L 21   ALT      0 - 32 IU/L 27   Cholesterol, total      100 - 199 mg/dL 142   Triglyceride      0 - 149 mg/dL 431 (H)   HDL Cholesterol      >39 mg/dL 34 (L)   VLDL, calculated      5 - 40 mg/dL 63 (H)   LDL, calculated      0 - 99 mg/dL 45   Creatinine, urine      Not Estab. mg/dL 45.4   Microalbumin, urine      Not Estab. ug/mL 10.1   Microalbumin/Creat. Ratio      0 - 29 mg/g creat 22   Hemoglobin A1c, (calculated)      4.8 - 5.6 % 7.1 (H)   Estimated average glucose      mg/dL 157       Assessment/Plan:   1) DM > Her A1C last week was 7.1%. Pt would like to combine some of her PO agents and would like to try a weekly GLP-1 agent. Will change pt to Jardiance-Metformin XR 12.5/1000mg BID and change pt to Ozempic 0.5mg weekly. Pt to continue the Nateglinide 120mg TID. Pt to check her BGs daily. 2) HTN > Her BP today was 122/70. Pt is on an ACEI for renal protection.  Will not make any changes at this time. 3) HLD > Her TC, LDL and Non-HLD were at goal in January 2022. Pt to continue her Atorvastatin 40mg daily. Pt voices understanding and agreement with the plan.     RTC 4 months      Copy sent to:  Dr. Antonio Trammell

## 2022-01-10 NOTE — LETTER
1/10/2022    Patient: Becka Vincent   YOB: 1950   Date of Visit: 1/10/2022     Temo Fletcher MD  [address] [address] [phone]  Via Fax: [phone]    Dear Temo Fletcher MD,      Thank you for referring Ms. Becka Vincent to [facility] for evaluation. My notes for this consultation are attached. If you have questions, please do not hesitate to call me. I look forward to following your patient along with you.       Sincerely,    Jose Luis Ponce MD

## 2022-01-10 NOTE — PATIENT INSTRUCTIONS
1) I am going to change you to a single pill of Jardiance-Metformin it will be a 12.5mg/1000mg pill and you will take one in the morning and one with dinner. 2) I am replacing the Victoza with Ozempic. Start at 0.25mg every week for 4 weeks, then increase to 0.5mg every week. After 6 weeks on the 0.5mg, send me your blood sugar readings and if need be we can increase to the 1.0mg every week.

## 2022-04-07 ENCOUNTER — TELEPHONE (OUTPATIENT)
Dept: ENDOCRINOLOGY | Age: 72
End: 2022-04-07

## 2022-04-07 NOTE — TELEPHONE ENCOUNTER
Received BG logs from Ms Ana Laura Li. Since starting the Ozempic her BGs have decreased from the 150-180s range, down to the 100-150s range. Pt notes she is tolerating the Ozempic 0.5mg weekly. Pt encouraged to continue this dose. Pt voices understanding and agreement with the plan.

## 2022-05-13 ENCOUNTER — VIRTUAL VISIT (OUTPATIENT)
Dept: ENDOCRINOLOGY | Age: 72
End: 2022-05-13
Payer: MEDICARE

## 2022-05-13 DIAGNOSIS — E11.9 TYPE 2 DIABETES MELLITUS WITHOUT COMPLICATION, WITHOUT LONG-TERM CURRENT USE OF INSULIN (HCC): Primary | ICD-10-CM

## 2022-05-13 DIAGNOSIS — E78.2 MIXED HYPERLIPIDEMIA: ICD-10-CM

## 2022-05-13 DIAGNOSIS — I10 ESSENTIAL HYPERTENSION: ICD-10-CM

## 2022-05-13 PROCEDURE — 1090F PRES/ABSN URINE INCON ASSESS: CPT | Performed by: INTERNAL MEDICINE

## 2022-05-13 PROCEDURE — 3051F HG A1C>EQUAL 7.0%<8.0%: CPT | Performed by: INTERNAL MEDICINE

## 2022-05-13 PROCEDURE — 2022F DILAT RTA XM EVC RTNOPTHY: CPT | Performed by: INTERNAL MEDICINE

## 2022-05-13 PROCEDURE — G8432 DEP SCR NOT DOC, RNG: HCPCS | Performed by: INTERNAL MEDICINE

## 2022-05-13 PROCEDURE — 3017F COLORECTAL CA SCREEN DOC REV: CPT | Performed by: INTERNAL MEDICINE

## 2022-05-13 PROCEDURE — 1101F PT FALLS ASSESS-DOCD LE1/YR: CPT | Performed by: INTERNAL MEDICINE

## 2022-05-13 PROCEDURE — G8399 PT W/DXA RESULTS DOCUMENT: HCPCS | Performed by: INTERNAL MEDICINE

## 2022-05-13 PROCEDURE — G8756 NO BP MEASURE DOC: HCPCS | Performed by: INTERNAL MEDICINE

## 2022-05-13 PROCEDURE — G8427 DOCREV CUR MEDS BY ELIG CLIN: HCPCS | Performed by: INTERNAL MEDICINE

## 2022-05-13 PROCEDURE — 99214 OFFICE O/P EST MOD 30 MIN: CPT | Performed by: INTERNAL MEDICINE

## 2022-05-13 NOTE — PROGRESS NOTES
Chief Complaint   Patient presents with    Diabetes     pcp and pharmacy verified   RAULITO GARCIA    EMAIL: [email or web address]   Records since last visit reviewed. **THIS IS A VIRTUAL VISIT VIA A VIDEO ENCOUNTER. PATIENT AGREED TO HAVE THEIR CARE DELIVERED OVER VIDEO IN PLACE OF THEIR REGULARLY SCHEDULED OFFICE VISIT**         Chichi Moore, was evaluated through a synchronous (real-time) audio-video encounter. The patient (or guardian if applicable) is aware that this is a billable service, which includes applicable co-pays. This Virtual Visit was conducted with patient's (and/or legal guardian's) consent. The visit was conducted pursuant to the emergency declaration under the [facility], [address] [place] [address] authority and the [employer] Act. Patient identification was verified, and a caregiver was present when appropriate. The patient was located in a state where the provider was licensed to provide care. History of Present Illness: Chichi Moore is a 74 y.o. female here for follow up of diabetes. She was diagnosed with diabetes 10+ years. Pt notes he had two breast biopsy and one came back \"not good\" and she needed surgery. 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 She was referred oncology and she was started Anastrazole daily. She had a colonoscopy which showed nothing concerning. Yoni Kong She was told that since she did not have a gall bladder that was the cause of her diarrhea. She has been started on prevealyte BID and that has helped resolve her post-prandial diarrhea. At our last visit in January 2022 her A1C was up to 7.1% and we started her on Ozempic 0.5mg weekly. Pt notes she fell two weeks ago and broke her right ankle \"it has been quite a challenge. \"    Pt received the J&J COVID vaccination and [employer] booster. She is still taking the Anastrazole. \"I go back to see my Oncologist in June and we will discuss how long I stay on this. \"    Pt is still taking the Nateglinide 120mg TID, Metformin-Jardiance 1000/12.5mg BID and Ozempic 0.5mg weekly. She is checking her BGs every morning and her FBGs have been running in the 110-150s. She denies issues of hypoglycemia. A typical day is as follows:  - She has breakfast around 8AM, today she had a bowl of cereal, strawberries and milk. - She does not tend to have a mid-morning snack  - She has lunch around 1230PM, yesterday she had a chicken sandwich, chips, and water. - She has dinner around 6PM, last night she had chicken casserole, corn bread and unsweetened iced tea. Exercise consists of housework. Sewing classes haved restarted. No history of vascular disease. No history of neuropathy, or nephropathy. Last eye exam was July 2021, no retinopathy, record reviewed. Current Outpatient Medications   Medication Sig    empagliflozin-metformin 12.5-1,000 mg TBph One pill in the morning and one pill with dinner    semaglutide (OZEMPIC) 0.25 mg or 0.5 mg/dose (2 mg/1.5 ml) subq pen 0.5 mg by SubCUTAneous route every seven (7) days.  OneTouch UltraSoft Lancets misc TEST TWICE DAILY    OneTouch Ultra Blue Test Strip strip TEST TWICE DAILY    nateglinide (STARLIX) 120 mg tablet TAKE 1 TABLET BEFORE BREAKFAST LUNCH AND DINNER    lisinopriL (PRINIVIL, ZESTRIL) 10 mg tablet Take  by mouth daily.  anastrozole (ARIMIDEX) 1 mg tablet TK 1 T PO QD    Prevalite 4 gram packet 1/2 pack BID    PRICILLA PEN NEEDLE 32 gauge x 5/32\" ndle     atorvastatin (LIPITOR) 40 mg tablet Take  by mouth daily.  escitalopram oxalate (LEXAPRO) 10 mg tablet Take 10 mg by mouth daily.  calcium-cholecalciferol, d3, (CALCIUM 600 + D) 600-125 mg-unit tab Take  by mouth two (2) times a day.  cholecalciferol, VITAMIN D3, (VITAMIN D3) 5,000 unit tab tablet Take  by mouth daily.     aspirin delayed-release 81 mg tablet Take  by mouth daily. No current facility-administered medications for this visit. Allergies   Allergen Reactions    Codeine Itching and Swelling     Review of Systems:  - Eyes: no blurry vision or double vision  - Cardiovascular: no chest pain  - Respiratory: no shortness of breath  - Musculoskeletal: no myalgias  - Neurological: no numbness/tingling in extremities    Physical Examination:  There were no vitals taken for this visit. - GENERAL: NCAT, Appears well nourished   - EYES: EOMI, non-icteric, no proptosis   - Ear/Nose/Throat: NCAT, no visible inflammation or masses   - CARDIOVASCULAR: no cyanosis, no visible JVD   - RESPIRATORY: respiratory effort normal without any distress or labored breathing   - MUSCULOSKELETAL: Normal ROM of neck and upper extremities observed   - SKIN: No rash on face   - NEUROLOGIC:  No facial asymmetry (Cranial nerve 7 motor function), No gaze palsy   - PSYCHIATRIC: Normal affect, Normal insight and judgement     Data Reviewed:   None    Assessment/Plan:   1) DM > Pt reports that her BGs have been running in a very good range and she has not had issues of hypoglycemia. Pt to continue the Jardiance-Metformin XR 12.5/1000mg BID, Ozempic 0.5mg weekly and Nateglinide 120mg TID. Pt to check her BGs daily. Will order an A1C.    2) HTN > Pt is on an ACEI for renal protection. Will not make any changes at this time. 3) HLD > Her TC, LDL and Non-HLD were at goal in January 2022. Pt to continue her Atorvastatin 40mg daily. Pt voices understanding and agreement with the plan. RTC 4 months  Follow-up and Dispositions    · Return in about 4 months (around 9/13/2022).          Copy sent to:  Dr. Rodríguez Sparrow

## 2022-05-13 NOTE — LETTER
5/13/2022 8:59 AM    Patient:  Penny Rowe   YOB: 1950  Date of Visit: 5/13/2022      Dear Isaias Officer, [address] [phone]  Via Fax: [phone]: Thank you for referring Ms. Penny Rowe to me for evaluation/treatment. Below are the relevant portions of my assessment and plan of care. If you have questions, please do not hesitate to call me. I look forward to following Ms. Ana Laura Li along with you. Chief Complaint   Patient presents with    Diabetes     pcp and pharmacy verified   [email or web address]    EMAIL: [email or web address]   Records since last visit reviewed. **THIS IS A VIRTUAL VISIT VIA A VIDEO ENCOUNTER. PATIENT AGREED TO HAVE THEIR CARE DELIVERED OVER VIDEO IN PLACE OF THEIR REGULARLY SCHEDULED OFFICE VISIT**         Penny Rowe, was evaluated through a synchronous (real-time) audio-video encounter. The patient (or guardian if applicable) is aware that this is a billable service, which includes applicable co-pays. This Virtual Visit was conducted with patient's (and/or legal guardian's) consent. The visit was conducted pursuant to the emergency declaration under the [address] [place] [address] authority and the [employer] General Act. Patient identification was verified, and a caregiver was present when appropriate. The patient was located in a state where the provider was licensed to provide care. History of Present Illness: Penny Rowe is a 74 y.o. female here for follow up of diabetes. She was diagnosed with diabetes 10+ years. Pt notes he had two breast biopsy and one came back \"not good\" and she needed surgery. 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 She was referred oncology and she was started Anastrazole daily. She had a colonoscopy which showed nothing concerning. Canelo Bauer   She was told that since she did not have a gall bladder that was the cause of her diarrhea. She has been started on prevealyte BID and that has helped resolve her post-prandial diarrhea. At our last visit in January 2022 her A1C was up to 7.1% and we started her on Ozempic 0.5mg weekly. Pt notes she fell two weeks ago and broke her right ankle \"it has been quite a challenge. \"    Pt received the [employer] COVID vaccination and [employer] booster. She is still taking the Anastrazole. \"I go back to see my Oncologist in June and we will discuss how long I stay on this. \"    Pt is still taking the Nateglinide 120mg TID, Metformin-Jardiance 1000/12.5mg BID and Ozempic 0.5mg weekly. She is checking her BGs every morning and her FBGs have been running in the 110-150s. She denies issues of hypoglycemia. A typical day is as follows:  - She has breakfast around 8AM, today she had a bowl of cereal, strawberries and milk. - She does not tend to have a mid-morning snack  - She has lunch around 1230PM, yesterday she had a chicken sandwich, chips, and water. - She has dinner around 6PM, last night she had chicken casserole, corn bread and unsweetened iced tea. Exercise consists of housework. Sewing classes haved restarted. No history of vascular disease. No history of neuropathy, or nephropathy. Last eye exam was July 2021, no retinopathy, record reviewed. Current Outpatient Medications   Medication Sig    empagliflozin-metformin 12.5-1,000 mg TBph One pill in the morning and one pill with dinner    semaglutide (OZEMPIC) 0.25 mg or 0.5 mg/dose (2 mg/1.5 ml) subq pen 0.5 mg by SubCUTAneous route every seven (7) days.  OneTouch UltraSoft Lancets misc TEST TWICE DAILY    OneTouch Ultra Blue Test Strip strip TEST TWICE DAILY    nateglinide (STARLIX) 120 mg tablet TAKE 1 TABLET BEFORE BREAKFAST LUNCH AND DINNER    lisinopriL (PRINIVIL, ZESTRIL) 10 mg tablet Take  by mouth daily.     anastrozole (ARIMIDEX) 1 mg tablet TK 1 T PO QD    Prevalite 4 gram packet 1/2 pack BID    PRICILLA PEN NEEDLE 32 gauge x 5/32\" ndle     atorvastatin (LIPITOR) 40 mg tablet Take  by mouth daily.  escitalopram oxalate (LEXAPRO) 10 mg tablet Take 10 mg by mouth daily.  calcium-cholecalciferol, d3, (CALCIUM 600 + D) 600-125 mg-unit tab Take  by mouth two (2) times a day.  cholecalciferol, VITAMIN D3, (VITAMIN D3) 5,000 unit tab tablet Take  by mouth daily.  aspirin delayed-release 81 mg tablet Take  by mouth daily. No current facility-administered medications for this visit. Allergies   Allergen Reactions    Codeine Itching and Swelling     Review of Systems:  - Eyes: no blurry vision or double vision  - Cardiovascular: no chest pain  - Respiratory: no shortness of breath  - Musculoskeletal: no myalgias  - Neurological: no numbness/tingling in extremities    Physical Examination:  There were no vitals taken for this visit. - GENERAL: NCAT, Appears well nourished   - EYES: EOMI, non-icteric, no proptosis   - Ear/Nose/Throat: NCAT, no visible inflammation or masses   - CARDIOVASCULAR: no cyanosis, no visible JVD   - RESPIRATORY: respiratory effort normal without any distress or labored breathing   - MUSCULOSKELETAL: Normal ROM of neck and upper extremities observed   - SKIN: No rash on face   - NEUROLOGIC:  No facial asymmetry (Cranial nerve 7 motor function), No gaze palsy   - PSYCHIATRIC: Normal affect, Normal insight and judgement     Data Reviewed:   None    Assessment/Plan:   1) DM > Pt reports that her BGs have been running in a very good range and she has not had issues of hypoglycemia. Pt to continue the Jardiance-Metformin XR 12.5/1000mg BID, Ozempic 0.5mg weekly and Nateglinide 120mg TID. Pt to check her BGs daily. Will order an A1C.    2) HTN > Pt is on an ACEI for renal protection. Will not make any changes at this time.     3) HLD > Her TC, LDL and Non-HLD were at goal in January 2022. Pt to continue her Atorvastatin 40mg daily. Pt voices understanding and agreement with the plan.     RTC 4 months      Copy sent to:  Dr. Nikolay Wynn      Sincerely,      Teodoro Louis MD

## 2022-05-18 RX ORDER — SEMAGLUTIDE 1.34 MG/ML
INJECTION, SOLUTION SUBCUTANEOUS
Qty: 1.5 ML | Refills: 12 | Status: SHIPPED | OUTPATIENT
Start: 2022-05-18

## 2022-05-18 RX ORDER — BLOOD SUGAR DIAGNOSTIC
STRIP MISCELLANEOUS
Qty: 200 STRIP | Refills: 3 | Status: SHIPPED | OUTPATIENT
Start: 2022-05-18 | End: 2022-08-05 | Stop reason: SDUPTHER

## 2022-06-04 LAB
EST. AVERAGE GLUCOSE BLD GHB EST-MCNC: 131 MG/DL
HBA1C MFR BLD: 6.2 % (ref 4.8–5.6)

## 2022-06-06 NOTE — PROGRESS NOTES
Spoke with pt regarding her A1C, which was 6.2%. Pt instructed to continue the Jardiance-Metformin XR 12.5/1000mg BID, Ozempic 0.5mg weekly and Nateglinide 120mg TID. Pt voices understanding and agreement with the plan.

## 2022-08-05 RX ORDER — BLOOD SUGAR DIAGNOSTIC
STRIP MISCELLANEOUS
Qty: 200 STRIP | Refills: 3 | Status: SHIPPED | OUTPATIENT
Start: 2022-08-05

## 2022-09-13 ENCOUNTER — OFFICE VISIT (OUTPATIENT)
Dept: ENDOCRINOLOGY | Age: 72
End: 2022-09-13
Payer: MEDICARE

## 2022-09-13 VITALS
DIASTOLIC BLOOD PRESSURE: 64 MMHG | SYSTOLIC BLOOD PRESSURE: 121 MMHG | BODY MASS INDEX: 30.97 KG/M2 | WEIGHT: 181.4 LBS | HEART RATE: 92 BPM | HEIGHT: 64 IN

## 2022-09-13 DIAGNOSIS — I10 ESSENTIAL HYPERTENSION: ICD-10-CM

## 2022-09-13 DIAGNOSIS — E11.9 TYPE 2 DIABETES MELLITUS WITHOUT COMPLICATION, WITHOUT LONG-TERM CURRENT USE OF INSULIN (HCC): Primary | ICD-10-CM

## 2022-09-13 DIAGNOSIS — E78.2 MIXED HYPERLIPIDEMIA: ICD-10-CM

## 2022-09-13 LAB — HBA1C MFR BLD HPLC: 6.3 %

## 2022-09-13 PROCEDURE — G8427 DOCREV CUR MEDS BY ELIG CLIN: HCPCS | Performed by: INTERNAL MEDICINE

## 2022-09-13 PROCEDURE — G8432 DEP SCR NOT DOC, RNG: HCPCS | Performed by: INTERNAL MEDICINE

## 2022-09-13 PROCEDURE — 83036 HEMOGLOBIN GLYCOSYLATED A1C: CPT | Performed by: INTERNAL MEDICINE

## 2022-09-13 PROCEDURE — G8754 DIAS BP LESS 90: HCPCS | Performed by: INTERNAL MEDICINE

## 2022-09-13 PROCEDURE — 1090F PRES/ABSN URINE INCON ASSESS: CPT | Performed by: INTERNAL MEDICINE

## 2022-09-13 PROCEDURE — 99214 OFFICE O/P EST MOD 30 MIN: CPT | Performed by: INTERNAL MEDICINE

## 2022-09-13 PROCEDURE — 2022F DILAT RTA XM EVC RTNOPTHY: CPT | Performed by: INTERNAL MEDICINE

## 2022-09-13 PROCEDURE — G8536 NO DOC ELDER MAL SCRN: HCPCS | Performed by: INTERNAL MEDICINE

## 2022-09-13 PROCEDURE — 1101F PT FALLS ASSESS-DOCD LE1/YR: CPT | Performed by: INTERNAL MEDICINE

## 2022-09-13 PROCEDURE — G8752 SYS BP LESS 140: HCPCS | Performed by: INTERNAL MEDICINE

## 2022-09-13 PROCEDURE — G8417 CALC BMI ABV UP PARAM F/U: HCPCS | Performed by: INTERNAL MEDICINE

## 2022-09-13 PROCEDURE — 3044F HG A1C LEVEL LT 7.0%: CPT | Performed by: INTERNAL MEDICINE

## 2022-09-13 PROCEDURE — 3017F COLORECTAL CA SCREEN DOC REV: CPT | Performed by: INTERNAL MEDICINE

## 2022-09-13 PROCEDURE — 1123F ACP DISCUSS/DSCN MKR DOCD: CPT | Performed by: INTERNAL MEDICINE

## 2022-09-13 PROCEDURE — G8399 PT W/DXA RESULTS DOCUMENT: HCPCS | Performed by: INTERNAL MEDICINE

## 2022-09-13 NOTE — LETTER
9/13/2022    Patient: Benjamin Payne   YOB: 1950   Date of Visit: 9/13/2022     Jacobo Joe MD  [facility]  [address] [address]  Via Fax: [phone]    Dear Jacobo Joe MD,      Thank you for referring Ms. Benjamin Payne to [facility] for evaluation. My notes for this consultation are attached. If you have questions, please do not hesitate to call me. I look forward to following your patient along with you.       Sincerely,    Ana Cristina Aguilar MD

## 2022-09-13 NOTE — PROGRESS NOTES
Chief Complaint   Patient presents with    Diabetes     Pcp and pharmacy verififed     Records since last visit reviewed. History of Present Illness: Pia Hernandez is a 74 y.o. female here for follow up of diabetes. She was diagnosed with diabetes 10+ years. At our last visit in May 2022 her A1C was 6.2% on Ozepic 0.5mg weekly, Metformin-Jardiance 1000.12.5mg BID and Nateglinide 120mg TID. Pt was encouraged to keep up the good work. Her A1C today was 6.3%. She fell and fractured her right ankle in April 2022. \"It is doing great, it healed well with no problems. \"    Pt received the [employer] COVID vaccination and [employer] booster. She is still taking the Anastrazole. \"I met with the Oncologist in June of 2022 and he said I should keep taking it. \"    Pt is still taking the Nateglinide 120mg TID, Metformin-Jardiance 1000/12.5mg BID and Ozempic 0.5mg weekly. She is checking her BGs every morning and her FBGs have been running in the 100-170s. She denies issues of hypoglycemia. A typical day is as follows:  - She has breakfast around 8AM, today she had a sausage biscuit and hot tea. - She does not tend to have a mid-morning snack  - She has lunch around 1230PM, today she had chicken salad, a tomato and a cracker and iced tea (unsweetened). - She has dinner around 6PM, last night she had pork chops, succutash, rolls, apple sauce and unsweetened iced tea. Exercise consists of housework. Sewing classes haved restarted. She is not working on any particular projects at this time. No history of vascular disease. No history of neuropathy, or nephropathy. Last eye exam was July 2022, no retinopathy, record reviewed.        Current Outpatient Medications   Medication Sig    glucose blood VI test strips (OneTouch Ultra Test) strip TEST TWICE DAILY    Ozempic 0.25 mg or 0.5 mg/dose (2 mg/1.5 ml) subq pen INJECT 0.5 MG UNDER THE SKIN EVERY 7 DAYS    nateglinide (STARLIX) 120 mg tablet TAKE 1 TABLET BEFORE BREAKFAST LUNCH AND DINNER    empagliflozin-metformin 12.5-1,000 mg TBph One pill in the morning and one pill with dinner    OneTouch UltraSoft Lancets misc TEST TWICE DAILY    lisinopriL (PRINIVIL, ZESTRIL) 10 mg tablet Take  by mouth daily. anastrozole (ARIMIDEX) 1 mg tablet TK 1 T PO QD    Prevalite 4 gram packet 1/2 pack BID    PRICILLA PEN NEEDLE 32 gauge x 5/32\" ndle     atorvastatin (LIPITOR) 40 mg tablet Take  by mouth daily. escitalopram oxalate (LEXAPRO) 10 mg tablet Take 10 mg by mouth daily. calcium-cholecalciferol, d3, 600-125 mg-unit tab Take  by mouth two (2) times a day. cholecalciferol, VITAMIN D3, (VITAMIN D3) 5,000 unit tab tablet Take  by mouth daily. aspirin delayed-release 81 mg tablet Take  by mouth daily. No current facility-administered medications for this visit. Allergies   Allergen Reactions    Codeine Itching and Swelling     Review of Systems:  - Eyes: no blurry vision or double vision  - Cardiovascular: no chest pain  - Respiratory: no shortness of breath  - Musculoskeletal: no myalgias  - Neurological: no numbness/tingling in extremities    Physical Examination:  Blood pressure 121/64, pulse 92, height 5' 4\" (1.626 m), weight 181 lb 6.4 oz (82.3 kg). General: pleasant, no distress, good eye contact   Neck: no carotid bruits  Cardiovascular: regular, normal rate, nl s1 and s2, no m/r/g, 2+ DP pulses   Respiratory: clear bilaterally  Integumentary: no edema, no foot ulcers  Psychiatric: normal mood and affect    Diabetic foot exam:     Left Foot:   Visual Exam: callous - present   Pulse DP: 2+ (normal)   Filament test: normal sensation    Vibratory sensation: normal      Right Foot:   Visual Exam: callous - present   Pulse DP: 2+ (normal)   Filament test: normal sensation    Vibratory sensation: normal      Data Reviewed:   Her A1C today was 6.3%. Assessment/Plan:   1) DM > Her A1C today was 6.3%.  Pt reports that her BGs have been running in a very good range and she has not had issues of hypoglycemia. Pt to continue the Jardiance-Metformin XR 12.5/1000mg BID, Ozempic 0.5mg weekly and Nateglinide 120mg TID. Pt to check her BGs daily. Will order an A1C.    2) HTN > Pt is on an ACEI for renal protection. Will not make any changes at this time. Her BP today was 121/64. 3) HLD > Her TC, LDL and Non-HLD were at goal in January 2022. Pt to continue her Atorvastatin 40mg daily. Pt voices understanding and agreement with the plan. RTC 4 months  Follow-up and Dispositions    Return in about 4 months (around 1/13/2023).            Copy sent to:  Dr. Mariana Fatima

## 2023-01-01 DIAGNOSIS — E11.9 TYPE 2 DIABETES MELLITUS WITHOUT COMPLICATION, WITHOUT LONG-TERM CURRENT USE OF INSULIN (HCC): ICD-10-CM

## 2023-01-01 DIAGNOSIS — I10 ESSENTIAL HYPERTENSION: ICD-10-CM

## 2023-01-01 DIAGNOSIS — E78.2 MIXED HYPERLIPIDEMIA: ICD-10-CM

## 2023-01-10 ENCOUNTER — TELEPHONE (OUTPATIENT)
Dept: ENDOCRINOLOGY | Age: 73
End: 2023-01-10

## 2023-01-10 NOTE — TELEPHONE ENCOUNTER
1/10/2023  9:54 AM      Patient called and left a voice mail at 9:36 am.Pt stated she received a call from [facility] they are looking for an authorization form for the ozempic they sent and have not received it back. Pt stated medicare is bucking it until they get the form back. #[phone]      Thanks,  Jori Manju

## 2023-01-12 ENCOUNTER — DOCUMENTATION ONLY (OUTPATIENT)
Dept: ENDOCRINOLOGY | Age: 73
End: 2023-01-12

## 2023-01-17 ENCOUNTER — TRANSCRIBE ORDER (OUTPATIENT)
Dept: SCHEDULING | Age: 73
End: 2023-01-17

## 2023-01-17 DIAGNOSIS — M81.0 AGE RELATED OSTEOPOROSIS: Primary | ICD-10-CM

## 2023-01-23 ENCOUNTER — OFFICE VISIT (OUTPATIENT)
Dept: ENDOCRINOLOGY | Age: 73
End: 2023-01-23
Payer: MEDICARE

## 2023-01-23 VITALS
BODY MASS INDEX: 30.05 KG/M2 | WEIGHT: 176 LBS | HEIGHT: 64 IN | SYSTOLIC BLOOD PRESSURE: 120 MMHG | HEART RATE: 84 BPM | DIASTOLIC BLOOD PRESSURE: 62 MMHG

## 2023-01-23 DIAGNOSIS — I10 ESSENTIAL HYPERTENSION: ICD-10-CM

## 2023-01-23 DIAGNOSIS — E78.2 MIXED HYPERLIPIDEMIA: ICD-10-CM

## 2023-01-23 DIAGNOSIS — E11.9 TYPE 2 DIABETES MELLITUS WITHOUT COMPLICATION, WITHOUT LONG-TERM CURRENT USE OF INSULIN (HCC): Primary | ICD-10-CM

## 2023-01-23 PROCEDURE — 1123F ACP DISCUSS/DSCN MKR DOCD: CPT | Performed by: INTERNAL MEDICINE

## 2023-01-23 PROCEDURE — 1090F PRES/ABSN URINE INCON ASSESS: CPT | Performed by: INTERNAL MEDICINE

## 2023-01-23 PROCEDURE — 2022F DILAT RTA XM EVC RTNOPTHY: CPT | Performed by: INTERNAL MEDICINE

## 2023-01-23 PROCEDURE — 3074F SYST BP LT 130 MM HG: CPT | Performed by: INTERNAL MEDICINE

## 2023-01-23 PROCEDURE — G8427 DOCREV CUR MEDS BY ELIG CLIN: HCPCS | Performed by: INTERNAL MEDICINE

## 2023-01-23 PROCEDURE — G8536 NO DOC ELDER MAL SCRN: HCPCS | Performed by: INTERNAL MEDICINE

## 2023-01-23 PROCEDURE — G8417 CALC BMI ABV UP PARAM F/U: HCPCS | Performed by: INTERNAL MEDICINE

## 2023-01-23 PROCEDURE — G8399 PT W/DXA RESULTS DOCUMENT: HCPCS | Performed by: INTERNAL MEDICINE

## 2023-01-23 PROCEDURE — G8432 DEP SCR NOT DOC, RNG: HCPCS | Performed by: INTERNAL MEDICINE

## 2023-01-23 PROCEDURE — 99214 OFFICE O/P EST MOD 30 MIN: CPT | Performed by: INTERNAL MEDICINE

## 2023-01-23 PROCEDURE — 1101F PT FALLS ASSESS-DOCD LE1/YR: CPT | Performed by: INTERNAL MEDICINE

## 2023-01-23 PROCEDURE — 3078F DIAST BP <80 MM HG: CPT | Performed by: INTERNAL MEDICINE

## 2023-01-23 PROCEDURE — 3017F COLORECTAL CA SCREEN DOC REV: CPT | Performed by: INTERNAL MEDICINE

## 2023-01-23 PROCEDURE — 3044F HG A1C LEVEL LT 7.0%: CPT | Performed by: INTERNAL MEDICINE

## 2023-01-23 RX ORDER — LANCETS
EACH MISCELLANEOUS
Qty: 200 EACH | Refills: 3 | Status: SHIPPED | OUTPATIENT
Start: 2023-01-23

## 2023-01-23 RX ORDER — SEMAGLUTIDE 1.34 MG/ML
INJECTION, SOLUTION SUBCUTANEOUS
Qty: 1.5 ML | Refills: 12 | Status: SHIPPED | OUTPATIENT
Start: 2023-01-23

## 2023-01-23 RX ORDER — DICYCLOMINE HYDROCHLORIDE 20 MG/1
TABLET ORAL
COMMUNITY
Start: 2023-01-09

## 2023-01-23 NOTE — PROGRESS NOTES
Chief Complaint   Patient presents with    Diabetes     Pcp and pharmacy verified     Records since last visit reviewed. History of Present Illness: Issa Meyer is a 74 y.o. female here for follow up of diabetes. She was diagnosed with diabetes 10+ years. At our last visit in September 2022 her A1C was 6.3% on Ozepic 0.5mg weekly, Metformin-Jardiance 1000.12.5mg BID and Nateglinide 120mg TID. Pt was encouraged to keep up the good work. Pt denies any recent illnesses, injuries or hospitalizations. She notes her right ankle is doing well and she is still in PT \"that is helping a lot. \"    Pt is still taking the Nateglinide 120mg TID, Metformin-Jardiance 1000/12.5mg BID and Ozempic 0.5mg weekly. She is checking her BGs every other morning and her FBGs have been running in the 's. She denies issues of hypoglycemia. Her A1C last week was 6.4%. She is still taking the Anastrazole. \"I met with the Oncologist in June of 2022 and he said I should keep taking it. I see him once a year. \"    A typical day is as follows:  - She has breakfast around 8AM, yesterday she had 1/2 bagel and water. - She does not tend to have a mid-morning snack  - She has lunch around Noon, yesterday she had a ham sandwich, chips and water. - She has dinner around 6PM, last night she had spaghetti and ground beef casserole and water. Exercise consists of housework. Sewing classes haved restarted. She is not working on any particular projects at this time. No history of vascular disease. No history of neuropathy, or nephropathy. Last eye exam was July 2022, no retinopathy, record reviewed. Current Outpatient Medications   Medication Sig    semaglutide (Ozempic) 0.25 mg or 0.5 mg/dose (2 mg/1.5 ml) subq pen INJECT 0.5 MG UNDER THE SKIN EVERY 7 DAYS    OneTouch UltraSoft Lancets misc TEST TWICE DAILY.  E11.9    empagliflozin-metformin (Synjardy XR) 12.5-1,000 mg TBph TAKE 1 TABLET IN THE MORNING AND 1 TABLET WITH DINNER    glucose blood VI test strips (OneTouch Ultra Test) strip TEST TWICE DAILY    nateglinide (STARLIX) 120 mg tablet TAKE 1 TABLET BEFORE BREAKFAST LUNCH AND DINNER    lisinopriL (PRINIVIL, ZESTRIL) 10 mg tablet Take  by mouth daily. anastrozole (ARIMIDEX) 1 mg tablet TK 1 T PO QD    PRICILLA PEN NEEDLE 32 gauge x 5/32\" ndle     atorvastatin (LIPITOR) 40 mg tablet Take  by mouth daily. escitalopram oxalate (LEXAPRO) 10 mg tablet Take 10 mg by mouth daily. calcium-cholecalciferol, d3, 600-125 mg-unit tab Take  by mouth two (2) times a day. cholecalciferol, VITAMIN D3, (VITAMIN D3) 5,000 unit tab tablet Take  by mouth daily. aspirin delayed-release 81 mg tablet Take  by mouth daily. dicyclomine (BENTYL) 20 mg tablet TAKE 1 TABLET BY MOUTH BEFORE A MEAL     No current facility-administered medications for this visit. Allergies   Allergen Reactions    Codeine Itching and Swelling     Review of Systems:  - Eyes: no blurry vision or double vision  - Cardiovascular: no chest pain  - Respiratory: no shortness of breath  - Musculoskeletal: no myalgias  - Neurological: no numbness/tingling in extremities    Physical Examination:  Blood pressure 120/62, pulse 84, height 5' 4\" (1.626 m), weight 176 lb (79.8 kg).   General: pleasant, no distress, good eye contact   Neck: no carotid bruits  Cardiovascular: regular, normal rate, nl s1 and s2, no m/r/g, 2+ DP pulses   Respiratory: clear bilaterally  Integumentary: no edema, no foot ulcers  Psychiatric: normal mood and affect    Diabetic foot exam:     Left Foot:   Visual Exam: callous - present   Pulse DP: 2+ (normal)   Filament test: normal sensation    Vibratory sensation: normal      Right Foot:   Visual Exam: callous - present   Pulse DP: 2+ (normal)   Filament test: normal sensation    Vibratory sensation: normal      Data Reviewed:   Component      Latest Ref Rng & Units 1/16/2023   Glucose      70 - 99 mg/dL 139 (H)   BUN      8 - 27 mg/dL 11   Creatinine      0.57 - 1.00 mg/dL 0.61   eGFR      >59 mL/min/1.73 94   BUN/Creatinine ratio      12 - 28 18   Sodium      134 - 144 mmol/L 141   Potassium      3.5 - 5.2 mmol/L 4.1   Chloride      96 - 106 mmol/L 101   CO2      20 - 29 mmol/L 26   Calcium      8.7 - 10.3 mg/dL 9.3   Protein, total      6.0 - 8.5 g/dL 6.9   Albumin      3.7 - 4.7 g/dL 4.7   GLOBULIN, TOTAL      1.5 - 4.5 g/dL 2.2   A-G Ratio      1.2 - 2.2 2.1   Bilirubin, total      0.0 - 1.2 mg/dL 0.5   Alk. phosphatase      44 - 121 IU/L 92   AST      0 - 40 IU/L 13   ALT      0 - 32 IU/L 17   Cholesterol, total      100 - 199 mg/dL 137   Triglyceride      0 - 149 mg/dL 262 (H)   HDL Cholesterol      >39 mg/dL 36 (L)   VLDL, calculated      5 - 40 mg/dL 42 (H)   LDL, calculated      0 - 99 mg/dL 59   Creatinine, urine random      Not Estab. mg/dL 65.1   Microalbumin, urine      Not Estab. ug/mL 9.9   Microalbumin/Creat. Ratio      0 - 29 mg/g creat 15   Hemoglobin A1c, (calculated)      4.8 - 5.6 % 6.4 (H)   Estimated average glucose      mg/dL 137       Assessment/Plan:   1) DM > Her A1C last week was 6.4%. Pt reports that her BGs have been running in a very good range and she has not had issues of hypoglycemia. Pt to continue the Jardiance-Metformin XR 12.5/1000mg BID, Ozempic 0.5mg weekly and Nateglinide 120mg TID. Pt to check her BGs daily. Will order an A1C.    2) HTN > Pt is on an ACEI for renal protection. Will not make any changes at this time. Her BP today was 120/62. 3) HLD > Her TC, LDL and Non-HLD were at goal in January 2023. Pt to continue her Atorvastatin 40mg daily. Pt voices understanding and agreement with the plan. RTC 6 months  Follow-up and Dispositions    Return in about 6 months (around 7/23/2023).              Copy sent to:  Dr. Deuce Zapata

## 2023-01-23 NOTE — LETTER
1/23/2023    Patient: Kusum Olvera   YOB: 1950   Date of Visit: 1/23/2023     Jennifer Carlson MD  [address] [place] [address]  Via Fax: [phone]    Dear Jennifer Carlson MD,      Thank you for referring Ms. Kusum Olvera to [facility] for evaluation. My notes for this consultation are attached. If you have questions, please do not hesitate to call me. I look forward to following your patient along with you.       Sincerely,    Carrie Cool MD

## 2023-03-13 ENCOUNTER — HOSPITAL ENCOUNTER (OUTPATIENT)
Dept: MAMMOGRAPHY | Age: 73
Discharge: HOME | End: 2023-03-13
Attending: FAMILY MEDICINE
Payer: MEDICARE

## 2023-03-13 DIAGNOSIS — M81.0 AGE RELATED OSTEOPOROSIS: ICD-10-CM

## 2023-03-13 PROCEDURE — 77080 DXA BONE DENSITY AXIAL: CPT

## 2023-07-24 ENCOUNTER — OFFICE VISIT (OUTPATIENT)
Age: 73
End: 2023-07-24
Payer: COMMERCIAL

## 2023-07-24 VITALS
HEART RATE: 75 BPM | BODY MASS INDEX: 30.52 KG/M2 | WEIGHT: 178.8 LBS | SYSTOLIC BLOOD PRESSURE: 113 MMHG | HEIGHT: 64 IN | DIASTOLIC BLOOD PRESSURE: 64 MMHG

## 2023-07-24 DIAGNOSIS — E11.9 TYPE 2 DIABETES MELLITUS WITHOUT COMPLICATION, WITHOUT LONG-TERM CURRENT USE OF INSULIN (HCC): Primary | ICD-10-CM

## 2023-07-24 DIAGNOSIS — I10 ESSENTIAL (PRIMARY) HYPERTENSION: ICD-10-CM

## 2023-07-24 DIAGNOSIS — E78.2 MIXED HYPERLIPIDEMIA: ICD-10-CM

## 2023-07-24 LAB — HBA1C MFR BLD: 6.3 %

## 2023-07-24 PROCEDURE — 99214 OFFICE O/P EST MOD 30 MIN: CPT | Performed by: INTERNAL MEDICINE

## 2023-07-24 PROCEDURE — G8427 DOCREV CUR MEDS BY ELIG CLIN: HCPCS | Performed by: INTERNAL MEDICINE

## 2023-07-24 PROCEDURE — 2022F DILAT RTA XM EVC RTNOPTHY: CPT | Performed by: INTERNAL MEDICINE

## 2023-07-24 PROCEDURE — 3078F DIAST BP <80 MM HG: CPT | Performed by: INTERNAL MEDICINE

## 2023-07-24 PROCEDURE — G8417 CALC BMI ABV UP PARAM F/U: HCPCS | Performed by: INTERNAL MEDICINE

## 2023-07-24 PROCEDURE — 1123F ACP DISCUSS/DSCN MKR DOCD: CPT | Performed by: INTERNAL MEDICINE

## 2023-07-24 PROCEDURE — G8399 PT W/DXA RESULTS DOCUMENT: HCPCS | Performed by: INTERNAL MEDICINE

## 2023-07-24 PROCEDURE — 3017F COLORECTAL CA SCREEN DOC REV: CPT | Performed by: INTERNAL MEDICINE

## 2023-07-24 PROCEDURE — 1090F PRES/ABSN URINE INCON ASSESS: CPT | Performed by: INTERNAL MEDICINE

## 2023-07-24 PROCEDURE — 83036 HEMOGLOBIN GLYCOSYLATED A1C: CPT | Performed by: INTERNAL MEDICINE

## 2023-07-24 PROCEDURE — 3044F HG A1C LEVEL LT 7.0%: CPT | Performed by: INTERNAL MEDICINE

## 2023-07-24 PROCEDURE — 3074F SYST BP LT 130 MM HG: CPT | Performed by: INTERNAL MEDICINE

## 2023-07-24 PROCEDURE — 1036F TOBACCO NON-USER: CPT | Performed by: INTERNAL MEDICINE

## 2023-07-24 NOTE — PROGRESS NOTES
Chief Complaint   Patient presents with    Diabetes     Pcp and pharmacy verified     History of Present Illness: Shilpa Kern is a 74 y.o. female here for follow up of diabetes. At our last visit in January 2023 her A1C was 6.4% on Ozepic 0.5mg weekly, Metformin-Jardiance 1000.12.5mg BID and Nateglinide 120mg TID. Pt was encouraged to keep up the good work. Pt denies any recent illnesses, injuries or hospitalizations. She notes her right ankle is doing well and she is still in PT \"that is helping a lot. \"    \"I have not had any falls since May 2022. \"    Pt is still taking the Nateglinide 120mg TID, Metformin-Jardiance 1000/12.5mg BID and Ozempic 0.5mg weekly. She is checking her BGs every other morning and her FBGs have been running in the 's. She denies issues of hypoglycemia. Her A1C today was 6.3%. She is still taking the Anastrazole. \"I see my oncologist in August 2023 and I have a mammogram due this week. I see him once a year. \"    A typical day is as follows:  - She has breakfast around 8AM, yesterday she had an egg, sausage, toast, potatoes and water. - She does not tend to have a mid-morning snack  - She has lunch around Noon-1230PM, yesterday she had lo mein and water. - She has dinner around 6PM, last night she had 2 tacos and water. Exercise consists of housework. She is not working on any particular [employer] projects at this time. No history of vascular disease. No history of neuropathy, or nephropathy. Last eye exam was July 2023, no retinopathy, record reviewed. Current Outpatient Medications   Medication Sig    ONE TOUCH ULTRASOFT LANCETS MISC TEST TWICE DAILY. E11.9    anastrozole (ARIMIDEX) 1 MG tablet TK 1 T PO QD    aspirin 81 MG EC tablet Take by mouth daily    atorvastatin (LIPITOR) 40 MG tablet Take by mouth daily    Calcium Carbonate-Vitamin D 600-3. 125 MG-MCG TABS Take by mouth 2 times daily    vitamin D3 (CHOLECALCIFEROL) 125

## 2023-11-27 RX ORDER — NATEGLINIDE 120 MG/1
TABLET ORAL
Qty: 270 TABLET | Refills: 0 | Status: SHIPPED | OUTPATIENT
Start: 2023-11-27 | End: 2023-11-27 | Stop reason: SDUPTHER

## 2023-11-27 RX ORDER — NATEGLINIDE 120 MG/1
TABLET ORAL
Qty: 270 TABLET | Refills: 1 | Status: SHIPPED | OUTPATIENT
Start: 2023-11-27

## 2023-11-27 RX ORDER — NATEGLINIDE 120 MG/1
TABLET ORAL
Qty: 42 TABLET | Refills: 0 | Status: SHIPPED | OUTPATIENT
Start: 2023-11-27 | End: 2023-11-27 | Stop reason: SDUPTHER

## 2023-11-27 RX ORDER — NATEGLINIDE 120 MG/1
TABLET ORAL
Qty: 270 TABLET | Refills: 3 | Status: CANCELLED | OUTPATIENT
Start: 2023-11-27

## 2023-11-27 NOTE — TELEPHONE ENCOUNTER
Patient left message on  stating that she needs a 14 day supply of generic Starlix sent to [place] in her chart. Her mail order will be late.

## 2023-11-27 NOTE — TELEPHONE ENCOUNTER
Received a fax from [facility] for refills of generic. [facility] will cancel the Rx for 270. Pharmacist states needs new Rx for #42 only until mail order arrives.

## 2023-11-28 RX ORDER — NATEGLINIDE 120 MG/1
TABLET ORAL
Qty: 42 TABLET | Refills: 0 | OUTPATIENT
Start: 2023-11-28

## 2023-12-08 RX ORDER — EMPAGLIFLOZIN, METFORMIN HYDROCHLORIDE 12.5; 1 MG/1; MG/1
TABLET, EXTENDED RELEASE ORAL
Qty: 180 TABLET | Refills: 1 | Status: SHIPPED | OUTPATIENT
Start: 2023-12-08

## 2024-01-01 DIAGNOSIS — I10 ESSENTIAL (PRIMARY) HYPERTENSION: ICD-10-CM

## 2024-01-01 DIAGNOSIS — E78.2 MIXED HYPERLIPIDEMIA: ICD-10-CM

## 2024-01-01 DIAGNOSIS — E11.9 TYPE 2 DIABETES MELLITUS WITHOUT COMPLICATION, WITHOUT LONG-TERM CURRENT USE OF INSULIN (HCC): ICD-10-CM

## 2024-01-08 LAB — HBA1C MFR BLD: 6.3 % (ref 4.8–5.6)

## 2024-01-09 LAB
ALBUMIN/CREAT UR: 19 MG/G CREAT (ref 0–29)
CREAT UR-MCNC: 55.9 MG/DL
MICROALBUMIN UR-MCNC: 10.4 UG/ML

## 2024-01-11 LAB
ALBUMIN SERPL-MCNC: 4.5 G/DL (ref 3.8–4.8)
ALBUMIN/GLOB SERPL: 2.1 {RATIO} (ref 1.2–2.2)
ALP SERPL-CCNC: 100 IU/L (ref 44–121)
ALT SERPL-CCNC: 15 IU/L (ref 0–32)
AST SERPL-CCNC: 13 IU/L (ref 0–40)
BILIRUB SERPL-MCNC: 0.6 MG/DL (ref 0–1.2)
BUN SERPL-MCNC: 16 MG/DL (ref 8–27)
BUN/CREAT SERPL: 25 (ref 12–28)
CALCIUM SERPL-MCNC: 9.7 MG/DL (ref 8.7–10.3)
CHLORIDE SERPL-SCNC: 99 MMOL/L (ref 96–106)
CHOLEST SERPL-MCNC: 138 MG/DL (ref 100–199)
CO2 SERPL-SCNC: 23 MMOL/L (ref 20–29)
CREAT SERPL-MCNC: 0.64 MG/DL (ref 0.57–1)
EGFRCR SERPLBLD CKD-EPI 2021: 92 ML/MIN/1.73
GLUCOSE SERPL-MCNC: 125 MG/DL (ref 70–99)
HDLC SERPL-MCNC: 34 MG/DL
LDLC SERPL CALC-MCNC: 58 MG/DL (ref 0–99)
Lab: NORMAL
POTASSIUM SERPL-SCNC: 4.5 MMOL/L (ref 3.5–5.2)
PROT SERPL-MCNC: 6.6 G/DL (ref 6–8.5)
SODIUM SERPL-SCNC: 139 MMOL/L (ref 134–144)
TRIGL SERPL-MCNC: 290 MG/DL (ref 0–149)
VLDLC SERPL CALC-MCNC: 46 MG/DL (ref 5–40)

## 2024-01-15 ENCOUNTER — OFFICE VISIT (OUTPATIENT)
Age: 74
End: 2024-01-15
Payer: COMMERCIAL

## 2024-01-15 VITALS
HEIGHT: 64 IN | BODY MASS INDEX: 30.52 KG/M2 | DIASTOLIC BLOOD PRESSURE: 68 MMHG | WEIGHT: 178.8 LBS | HEART RATE: 85 BPM | SYSTOLIC BLOOD PRESSURE: 102 MMHG

## 2024-01-15 DIAGNOSIS — I10 ESSENTIAL (PRIMARY) HYPERTENSION: ICD-10-CM

## 2024-01-15 DIAGNOSIS — E11.9 TYPE 2 DIABETES MELLITUS WITHOUT COMPLICATION, WITHOUT LONG-TERM CURRENT USE OF INSULIN (HCC): Primary | ICD-10-CM

## 2024-01-15 DIAGNOSIS — E78.2 MIXED HYPERLIPIDEMIA: ICD-10-CM

## 2024-01-15 PROCEDURE — 3074F SYST BP LT 130 MM HG: CPT | Performed by: INTERNAL MEDICINE

## 2024-01-15 PROCEDURE — G8417 CALC BMI ABV UP PARAM F/U: HCPCS | Performed by: INTERNAL MEDICINE

## 2024-01-15 PROCEDURE — 3044F HG A1C LEVEL LT 7.0%: CPT | Performed by: INTERNAL MEDICINE

## 2024-01-15 PROCEDURE — G8484 FLU IMMUNIZE NO ADMIN: HCPCS | Performed by: INTERNAL MEDICINE

## 2024-01-15 PROCEDURE — 1036F TOBACCO NON-USER: CPT | Performed by: INTERNAL MEDICINE

## 2024-01-15 PROCEDURE — 99214 OFFICE O/P EST MOD 30 MIN: CPT | Performed by: INTERNAL MEDICINE

## 2024-01-15 PROCEDURE — 1090F PRES/ABSN URINE INCON ASSESS: CPT | Performed by: INTERNAL MEDICINE

## 2024-01-15 PROCEDURE — 3017F COLORECTAL CA SCREEN DOC REV: CPT | Performed by: INTERNAL MEDICINE

## 2024-01-15 PROCEDURE — 3078F DIAST BP <80 MM HG: CPT | Performed by: INTERNAL MEDICINE

## 2024-01-15 PROCEDURE — 1123F ACP DISCUSS/DSCN MKR DOCD: CPT | Performed by: INTERNAL MEDICINE

## 2024-01-15 PROCEDURE — G8427 DOCREV CUR MEDS BY ELIG CLIN: HCPCS | Performed by: INTERNAL MEDICINE

## 2024-01-15 PROCEDURE — 2022F DILAT RTA XM EVC RTNOPTHY: CPT | Performed by: INTERNAL MEDICINE

## 2024-01-15 PROCEDURE — G8399 PT W/DXA RESULTS DOCUMENT: HCPCS | Performed by: INTERNAL MEDICINE

## 2024-01-15 RX ORDER — NATEGLINIDE 120 MG/1
TABLET ORAL
Qty: 270 TABLET | Refills: 1 | Status: SHIPPED | OUTPATIENT
Start: 2024-01-15

## 2024-01-15 RX ORDER — LANCETS
EACH MISCELLANEOUS
Qty: 100 EACH | Refills: 3 | Status: SHIPPED | OUTPATIENT
Start: 2024-01-15

## 2024-01-15 NOTE — PROGRESS NOTES
6.6    Albumin      3.8 - 4.8 g/dL 4.5    Globulin, Total      1.5 - 4.5 g/dL 2.1    Albumin/Globulin Ratio      1.2 - 2.2  2.1    BILIRUBIN TOTAL      0.0 - 1.2 mg/dL 0.6    Alk Phos      44 - 121 IU/L 100    AST      0 - 40 IU/L 13    ALT      0 - 32 IU/L 15    Cholesterol, Total      100 - 199 mg/dL 138    Triglycerides      0 - 149 mg/dL 290 (H)    HDL Cholesterol      >39 mg/dL 34 (L)    VLDL Cholesterol Calculated      5 - 40 mg/dL 46 (H)    LDL Calculated      0 - 99 mg/dL 58    Creatinine, Ur      Not Estab. mg/dL 55.9    Albumin, U      Not Estab. ug/mL 10.4    Microalb/Creat Ratio POC      0 - 29 mg/g creat 19    Hemoglobin A1C      4.8 - 5.6 % 6.3 (H)      Assessment/Plan:   1) DM > Her A1C last week was 6.3%. Pt reports that her BGs have been running in a very good range and she has not had issues of hypoglycemia.  Pt to continue the Ozempic 0.5mg weekly and Nateglinide 120mg TID.   Pt notes she has been having some issues of diarrhea, we agreed to try Empagliflozin-Metformin ER 25/1000mg ONCE PER DAY.  Pt to check her BGs daily.    2) HTN > Her BP today was 102/68. Pt is on an ACEI for renal protection. Will not make any changes at this time.     3) HLD > Her TC, LDL and Non-HLD were at goal in January 2024. Pt to continue her Atorvastatin 40mg daily.    Pt voices understanding and agreement with the plan.    RTC 6 months    Copy sent to:  Dr. Martinez Ceja

## 2024-01-23 RX ORDER — SEMAGLUTIDE 0.68 MG/ML
INJECTION, SOLUTION SUBCUTANEOUS
Qty: 9 ML | Refills: 2 | Status: SHIPPED | OUTPATIENT
Start: 2024-01-23

## 2024-06-24 ENCOUNTER — HOSPITAL ENCOUNTER (OUTPATIENT)
Facility: HOSPITAL | Age: 74
Discharge: HOME | End: 2024-06-27
Attending: INTERNAL MEDICINE
Payer: COMMERCIAL

## 2024-06-24 DIAGNOSIS — R10.32 LLQ ABDOMINAL PAIN: ICD-10-CM

## 2024-06-24 LAB — CREAT BLD-MCNC: 0.7 MG/DL (ref 0.6–1.3)

## 2024-06-24 PROCEDURE — 6360000004 HC RX CONTRAST MEDICATION: Performed by: INTERNAL MEDICINE

## 2024-06-24 PROCEDURE — 74177 CT ABD & PELVIS W/CONTRAST: CPT

## 2024-06-24 PROCEDURE — 82565 ASSAY OF CREATININE: CPT

## 2024-06-24 RX ADMIN — IOPAMIDOL 100 ML: 755 INJECTION, SOLUTION INTRAVENOUS at 07:48

## 2024-06-25 RX ORDER — EMPAGLIFLOZIN, METFORMIN HYDROCHLORIDE 25; 1000 MG/1; MG/1
TABLET, EXTENDED RELEASE ORAL
Qty: 90 TABLET | Refills: 1 | Status: SHIPPED | OUTPATIENT
Start: 2024-06-25

## 2024-07-01 DIAGNOSIS — E11.9 TYPE 2 DIABETES MELLITUS WITHOUT COMPLICATION, WITHOUT LONG-TERM CURRENT USE OF INSULIN (HCC): ICD-10-CM

## 2024-07-17 LAB
ALBUMIN SERPL-MCNC: 4 G/DL (ref 3.8–4.8)
ALP SERPL-CCNC: 121 IU/L (ref 44–121)
ALT SERPL-CCNC: 19 IU/L (ref 0–32)
AST SERPL-CCNC: 16 IU/L (ref 0–40)
BILIRUB SERPL-MCNC: 0.4 MG/DL (ref 0–1.2)
BUN SERPL-MCNC: 12 MG/DL (ref 8–27)
BUN/CREAT SERPL: 23 (ref 12–28)
CALCIUM SERPL-MCNC: 9.3 MG/DL (ref 8.7–10.3)
CHLORIDE SERPL-SCNC: 98 MMOL/L (ref 96–106)
CO2 SERPL-SCNC: 25 MMOL/L (ref 20–29)
CREAT SERPL-MCNC: 0.52 MG/DL (ref 0.57–1)
EGFRCR SERPLBLD CKD-EPI 2021: 96 ML/MIN/1.73
GLOBULIN SER CALC-MCNC: 2.3 G/DL (ref 1.5–4.5)
GLUCOSE SERPL-MCNC: 197 MG/DL (ref 70–99)
HBA1C MFR BLD: 7.8 % (ref 4.8–5.6)
POTASSIUM SERPL-SCNC: 4.6 MMOL/L (ref 3.5–5.2)
PROT SERPL-MCNC: 6.3 G/DL (ref 6–8.5)
SODIUM SERPL-SCNC: 137 MMOL/L (ref 134–144)

## 2024-07-23 ENCOUNTER — OFFICE VISIT (OUTPATIENT)
Age: 74
End: 2024-07-23
Payer: COMMERCIAL

## 2024-07-23 VITALS
HEART RATE: 79 BPM | SYSTOLIC BLOOD PRESSURE: 129 MMHG | WEIGHT: 182.8 LBS | BODY MASS INDEX: 31.21 KG/M2 | HEIGHT: 64 IN | DIASTOLIC BLOOD PRESSURE: 62 MMHG

## 2024-07-23 DIAGNOSIS — E78.2 MIXED HYPERLIPIDEMIA: ICD-10-CM

## 2024-07-23 DIAGNOSIS — E11.9 TYPE 2 DIABETES MELLITUS WITHOUT COMPLICATION, WITHOUT LONG-TERM CURRENT USE OF INSULIN (HCC): Primary | ICD-10-CM

## 2024-07-23 DIAGNOSIS — I10 ESSENTIAL (PRIMARY) HYPERTENSION: ICD-10-CM

## 2024-07-23 PROCEDURE — 1123F ACP DISCUSS/DSCN MKR DOCD: CPT | Performed by: INTERNAL MEDICINE

## 2024-07-23 PROCEDURE — 3078F DIAST BP <80 MM HG: CPT | Performed by: INTERNAL MEDICINE

## 2024-07-23 PROCEDURE — G8399 PT W/DXA RESULTS DOCUMENT: HCPCS | Performed by: INTERNAL MEDICINE

## 2024-07-23 PROCEDURE — 99214 OFFICE O/P EST MOD 30 MIN: CPT | Performed by: INTERNAL MEDICINE

## 2024-07-23 PROCEDURE — G2211 COMPLEX E/M VISIT ADD ON: HCPCS | Performed by: INTERNAL MEDICINE

## 2024-07-23 PROCEDURE — 1036F TOBACCO NON-USER: CPT | Performed by: INTERNAL MEDICINE

## 2024-07-23 PROCEDURE — G8417 CALC BMI ABV UP PARAM F/U: HCPCS | Performed by: INTERNAL MEDICINE

## 2024-07-23 PROCEDURE — 3051F HG A1C>EQUAL 7.0%<8.0%: CPT | Performed by: INTERNAL MEDICINE

## 2024-07-23 PROCEDURE — G8427 DOCREV CUR MEDS BY ELIG CLIN: HCPCS | Performed by: INTERNAL MEDICINE

## 2024-07-23 PROCEDURE — 1090F PRES/ABSN URINE INCON ASSESS: CPT | Performed by: INTERNAL MEDICINE

## 2024-07-23 PROCEDURE — 3074F SYST BP LT 130 MM HG: CPT | Performed by: INTERNAL MEDICINE

## 2024-07-23 NOTE — PROGRESS NOTES
Chief Complaint   Patient presents with    Diabetes     PCP and pharmacy confirmed     History of Present Illness: Queenie Juarez is a 74 y.o. female here for follow up of diabetes.    At our last visit in January 2024 her A1C was 6.3% on Ozepic 0.5mg weekly, Metformin-Jardiance XR 1000/25mg once daily and Nateglinide 120mg TID.  Pt was encouraged to keep up the good work.    Pt denies any recent illnesses, injuries or hospitalizations.    She was on prednisone for 2 weeks, for bronchitis in June-July.    Pt is still taking the Nateglinide 120mg TID, Metformin-Jardiance 1000/12.5mg BID and Ozempic 0.5mg every Monday.    \"I have not had any falls since May 2022.\"    She is checking her BGs every other morning and her FBGs have been running in the 130-140s.  She denies issues of hypoglycemia.    Her A1C last week was up to 7.8%.    She is still taking the Anastrazole. \"I saw my oncologist in August 2023 and I see him every 6 months.\" \"I have been on it for 3 years and my oncologist said I will be on it for 5 years.\"    - Pt wakes around 8AM.  - She has breakfast around 8-830AM, yesterday she had a BLT and water.            - She does not tend to have a mid-morning snack  - She has lunch around Noon-1230PM, yesterday she had PB crackers and water.     - She has dinner around 6PM, last night she had a hamburger, fries and water.             Exercise consists of housework. She is not working on any particular [employer] projects at this time.    No history of vascular disease.      No history of neuropathy, or nephropathy.      Last eye exam was July 2023, no retinopathy, record reviewed.     Current Outpatient Medications   Medication Sig    Psyllium (METAMUCIL PO) Take by mouth in the morning and at bedtime    Empagliflozin-metFORMIN HCl ER  MG TB24 Take one pill daily.    Semaglutide,0.25 or 0.5MG/DOS, (OZEMPIC, 0.25 OR 0.5 MG/DOSE,) 2 MG/3ML SOPN Inject 0.5 mg under the skin every 7 days    nateglinide (STARLIX)

## 2024-08-26 RX ORDER — NATEGLINIDE 120 MG/1
TABLET ORAL
Qty: 270 TABLET | Refills: 1 | Status: SHIPPED | OUTPATIENT
Start: 2024-08-26

## 2024-09-30 RX ORDER — SEMAGLUTIDE 0.68 MG/ML
INJECTION, SOLUTION SUBCUTANEOUS
Qty: 9 ML | Refills: 0 | Status: SHIPPED | OUTPATIENT
Start: 2024-09-30

## 2024-11-01 DIAGNOSIS — E11.9 TYPE 2 DIABETES MELLITUS WITHOUT COMPLICATION, WITHOUT LONG-TERM CURRENT USE OF INSULIN (HCC): ICD-10-CM

## 2024-11-01 DIAGNOSIS — I10 ESSENTIAL (PRIMARY) HYPERTENSION: ICD-10-CM

## 2024-11-01 DIAGNOSIS — E78.2 MIXED HYPERLIPIDEMIA: ICD-10-CM

## 2024-11-13 LAB
ALBUMIN SERPL-MCNC: 4.5 G/DL (ref 3.8–4.8)
ALP SERPL-CCNC: 142 IU/L (ref 44–121)
ALT SERPL-CCNC: 16 IU/L (ref 0–32)
AST SERPL-CCNC: 13 IU/L (ref 0–40)
BILIRUB SERPL-MCNC: 0.6 MG/DL (ref 0–1.2)
BUN SERPL-MCNC: 14 MG/DL (ref 8–27)
BUN/CREAT SERPL: 22 (ref 12–28)
CALCIUM SERPL-MCNC: 9.5 MG/DL (ref 8.7–10.3)
CHLORIDE SERPL-SCNC: 99 MMOL/L (ref 96–106)
CHOLEST SERPL-MCNC: 170 MG/DL (ref 100–199)
CO2 SERPL-SCNC: 25 MMOL/L (ref 20–29)
CREAT SERPL-MCNC: 0.65 MG/DL (ref 0.57–1)
EGFRCR SERPLBLD CKD-EPI 2021: 91 ML/MIN/1.73
GLOBULIN SER CALC-MCNC: 2.3 G/DL (ref 1.5–4.5)
GLUCOSE SERPL-MCNC: 195 MG/DL (ref 70–99)
HBA1C MFR BLD: 8.7 % (ref 4.8–5.6)
HDLC SERPL-MCNC: 34 MG/DL
IMP & REVIEW OF LAB RESULTS: NORMAL
LDLC SERPL CALC-MCNC: 69 MG/DL (ref 0–99)
Lab: NORMAL
POTASSIUM SERPL-SCNC: 4.2 MMOL/L (ref 3.5–5.2)
PROT SERPL-MCNC: 6.8 G/DL (ref 6–8.5)
SODIUM SERPL-SCNC: 139 MMOL/L (ref 134–144)
TRIGL SERPL-MCNC: 427 MG/DL (ref 0–149)
VLDLC SERPL CALC-MCNC: 67 MG/DL (ref 5–40)

## 2024-11-19 ENCOUNTER — OFFICE VISIT (OUTPATIENT)
Age: 74
End: 2024-11-19
Payer: COMMERCIAL

## 2024-11-19 VITALS
HEART RATE: 79 BPM | SYSTOLIC BLOOD PRESSURE: 130 MMHG | BODY MASS INDEX: 31.41 KG/M2 | HEIGHT: 64 IN | WEIGHT: 184 LBS | DIASTOLIC BLOOD PRESSURE: 80 MMHG

## 2024-11-19 DIAGNOSIS — E11.9 TYPE 2 DIABETES MELLITUS WITHOUT COMPLICATION, WITHOUT LONG-TERM CURRENT USE OF INSULIN (HCC): Primary | ICD-10-CM

## 2024-11-19 DIAGNOSIS — E78.2 MIXED HYPERLIPIDEMIA: ICD-10-CM

## 2024-11-19 DIAGNOSIS — I10 ESSENTIAL (PRIMARY) HYPERTENSION: ICD-10-CM

## 2024-11-19 PROCEDURE — 3052F HG A1C>EQUAL 8.0%<EQUAL 9.0%: CPT | Performed by: INTERNAL MEDICINE

## 2024-11-19 PROCEDURE — 1123F ACP DISCUSS/DSCN MKR DOCD: CPT | Performed by: INTERNAL MEDICINE

## 2024-11-19 PROCEDURE — 3079F DIAST BP 80-89 MM HG: CPT | Performed by: INTERNAL MEDICINE

## 2024-11-19 PROCEDURE — 3075F SYST BP GE 130 - 139MM HG: CPT | Performed by: INTERNAL MEDICINE

## 2024-11-19 PROCEDURE — G8427 DOCREV CUR MEDS BY ELIG CLIN: HCPCS | Performed by: INTERNAL MEDICINE

## 2024-11-19 PROCEDURE — G2211 COMPLEX E/M VISIT ADD ON: HCPCS | Performed by: INTERNAL MEDICINE

## 2024-11-19 PROCEDURE — 1090F PRES/ABSN URINE INCON ASSESS: CPT | Performed by: INTERNAL MEDICINE

## 2024-11-19 PROCEDURE — 99214 OFFICE O/P EST MOD 30 MIN: CPT | Performed by: INTERNAL MEDICINE

## 2024-11-19 PROCEDURE — G8399 PT W/DXA RESULTS DOCUMENT: HCPCS | Performed by: INTERNAL MEDICINE

## 2024-11-19 PROCEDURE — G8484 FLU IMMUNIZE NO ADMIN: HCPCS | Performed by: INTERNAL MEDICINE

## 2024-11-19 PROCEDURE — 1036F TOBACCO NON-USER: CPT | Performed by: INTERNAL MEDICINE

## 2024-11-19 PROCEDURE — G8417 CALC BMI ABV UP PARAM F/U: HCPCS | Performed by: INTERNAL MEDICINE

## 2024-11-19 RX ORDER — SEMAGLUTIDE 1.34 MG/ML
1 INJECTION, SOLUTION SUBCUTANEOUS
Qty: 9 ML | Refills: 1 | Status: SHIPPED | OUTPATIENT
Start: 2024-11-19

## 2024-11-19 NOTE — PROGRESS NOTES
Chief Complaint   Patient presents with    Diabetes     Pcp and pharmacy verified     History of Present Illness: Queenie Juarez is a 74 y.o. female here for follow up of diabetes.    At our visit in January 2024 her A1C was 6.3% on Ozepic 0.5mg weekly, Metformin-Jardiance XR 1000/25mg once daily and Nateglinide 120mg TID.  Pt was encouraged to keep up the good work.  In July 2024 her A1C was up to 7.8%, but she had been on prednisone for bronchitis, so we agreed to not make any changes.    \"I have gotten to the point that I get so lazy I can't move and my knees hurt and I have gained weight. I have been bad.\"    Pt denies any recent illnesses, injuries or hospitalizations.    Pt is still taking the Nateglinide 120mg TID, Metformin-Jardiance ER 1000/25mg daily and Ozempic 0.5mg every Monday.  (Pt was not able to tolerate more than 1000mg daily of the metformin).    She tests her BG 2-3 times per week, fasting. She notes her BG have been in the 170-200s.    Her A1C last week increased further to 8.7%.    \"I have not had any falls since May 2022.\"    She is still taking the Anastrazole. \"I saw my oncologist in October 2024 and I see him every 6 months.\" \"I have been on it for 4 years and my oncologist said I will be on it for 5 years.\"    - Pt wakes around 8AM.  - She has breakfast around 830AM, yesterday she had oatmeal with brown sugar and cinnamon and water..            - She does not tend to have a mid-morning snack  - She has lunch around 1230PM, yesterday she had crab casserole and water.      - She has dinner around 530-6PM, last night she had vegetable soup, a cinnamon muffin and water.              Exercise consists of housework. She is not working on any particular [employer] projects at this time.    No history of vascular disease.      No history of neuropathy, or nephropathy.      Last eye exam was October 2024, no retinopathy, record reviewed.     Current Outpatient Medications   Medication Sig

## 2024-11-29 ENCOUNTER — TELEPHONE (OUTPATIENT)
Age: 74
End: 2024-11-29

## 2024-11-29 NOTE — TELEPHONE ENCOUNTER
Per CMM: Queenie Juarez (Snyder: [id number]) - [id number]  status: PA Response - DeniedCreated: November 19th, 2024 [id number]: November 19th, 2024

## 2025-01-16 RX ORDER — NATEGLINIDE 120 MG/1
TABLET ORAL
Qty: 270 TABLET | Refills: 1 | Status: SHIPPED | OUTPATIENT
Start: 2025-01-16

## 2025-01-30 RX ORDER — SEMAGLUTIDE 1.34 MG/ML
1 INJECTION, SOLUTION SUBCUTANEOUS
Qty: 9 ML | Refills: 1 | Status: SHIPPED | OUTPATIENT
Start: 2025-01-30

## 2025-03-13 ENCOUNTER — TRANSCRIBE ORDERS (OUTPATIENT)
Facility: HOSPITAL | Age: 75
End: 2025-03-13

## 2025-03-13 DIAGNOSIS — M81.0 OSTEOPOROSIS, UNSPECIFIED OSTEOPOROSIS TYPE, UNSPECIFIED PATHOLOGICAL FRACTURE PRESENCE: Primary | ICD-10-CM

## 2025-03-21 ENCOUNTER — OFFICE VISIT (OUTPATIENT)
Age: 75
End: 2025-03-21
Payer: COMMERCIAL

## 2025-03-21 VITALS
DIASTOLIC BLOOD PRESSURE: 50 MMHG | HEART RATE: 93 BPM | BODY MASS INDEX: 31.31 KG/M2 | WEIGHT: 183.4 LBS | HEIGHT: 64 IN | SYSTOLIC BLOOD PRESSURE: 100 MMHG

## 2025-03-21 DIAGNOSIS — E11.9 TYPE 2 DIABETES MELLITUS WITHOUT COMPLICATION, WITHOUT LONG-TERM CURRENT USE OF INSULIN: Primary | ICD-10-CM

## 2025-03-21 DIAGNOSIS — E78.2 MIXED HYPERLIPIDEMIA: ICD-10-CM

## 2025-03-21 DIAGNOSIS — I10 ESSENTIAL (PRIMARY) HYPERTENSION: ICD-10-CM

## 2025-03-21 LAB — HBA1C MFR BLD: 7.5 %

## 2025-03-21 PROCEDURE — 1123F ACP DISCUSS/DSCN MKR DOCD: CPT | Performed by: INTERNAL MEDICINE

## 2025-03-21 PROCEDURE — 3051F HG A1C>EQUAL 7.0%<8.0%: CPT | Performed by: INTERNAL MEDICINE

## 2025-03-21 PROCEDURE — 99214 OFFICE O/P EST MOD 30 MIN: CPT | Performed by: INTERNAL MEDICINE

## 2025-03-21 PROCEDURE — G8399 PT W/DXA RESULTS DOCUMENT: HCPCS | Performed by: INTERNAL MEDICINE

## 2025-03-21 PROCEDURE — 3078F DIAST BP <80 MM HG: CPT | Performed by: INTERNAL MEDICINE

## 2025-03-21 PROCEDURE — 83036 HEMOGLOBIN GLYCOSYLATED A1C: CPT | Performed by: INTERNAL MEDICINE

## 2025-03-21 PROCEDURE — 3074F SYST BP LT 130 MM HG: CPT | Performed by: INTERNAL MEDICINE

## 2025-03-21 PROCEDURE — 1090F PRES/ABSN URINE INCON ASSESS: CPT | Performed by: INTERNAL MEDICINE

## 2025-03-21 PROCEDURE — G8427 DOCREV CUR MEDS BY ELIG CLIN: HCPCS | Performed by: INTERNAL MEDICINE

## 2025-03-21 PROCEDURE — G8417 CALC BMI ABV UP PARAM F/U: HCPCS | Performed by: INTERNAL MEDICINE

## 2025-03-21 PROCEDURE — 1036F TOBACCO NON-USER: CPT | Performed by: INTERNAL MEDICINE

## 2025-03-21 NOTE — PROGRESS NOTES
Chief Complaint   Patient presents with    Diabetes     Pcp and pharmacy verified     History of Present Illness: Queenie Juarez is a 75 y.o. female here for follow up of diabetes.    At our visit in January 2024 her A1C was 6.3% on Ozepic 0.5mg weekly, Metformin-Jardiance XR 1000/25mg once daily and Nateglinide 120mg TID.  Pt was encouraged to keep up the good work.  In July 2024 her A1C was up to 7.8%, but she had been on prednisone for bronchitis, so we agreed to not make any changes.  In November 2024 her A1C was up from 7.8% to 8.7%. Pt encouraged to stop the snacking and eating candy all day and to  increase her physical activity. We agreed to try increasing her Ozempic 1.0mg weekly and continue the Empagliflozin-Metformin ER 25/1000mg ONCE PER DAY and Nateglinide 120mg TID.     Pt denies any recent illnesses, injuries or hospitalizations.    \"I have had a lot of allergies\".    She has not been on any steroids, or prednisone since our last visit.    Pt is still taking the Nateglinide 120mg TID, Metformin-Jardiance ER 1000/25mg daily and Ozempic 1.0mg every Monday.  (Pt was not able to tolerate more than 1000mg daily of the metformin).    She tests her BG every Monday, before she takes her Ozempic, after breakfast. Her BG run in the 160-180.    Her A1C today was 7.5%.    \"I have gotten to the point that I get so lazy I can't move and my knees hurt.\"    \"I have not had any falls since May 2022.\"    She is still taking the Anastrazole. \"I saw my oncologist in October 2024 and I see him every 6 months.\" \"I have been on it for 4 years and my oncologist said I will be on it for 5 years.\"    - Pt wakes around 8AM.  - She has breakfast around 830AM, yesterday she had 1/2 a cinnamon-rasin bagel and hot tea.             - She will occasionally have PB crackers, or fruit for her mid-morning snack  - She has lunch around Noon, yesterday she had a garden salad with grilled chicken and unsweetened iced tea.   - She will have

## 2025-04-23 ENCOUNTER — HOSPITAL ENCOUNTER (OUTPATIENT)
Facility: HOSPITAL | Age: 75
Discharge: HOME | End: 2025-04-26

## 2025-04-23 DIAGNOSIS — M81.0 OSTEOPOROSIS, UNSPECIFIED OSTEOPOROSIS TYPE, UNSPECIFIED PATHOLOGICAL FRACTURE PRESENCE: ICD-10-CM

## 2025-06-13 ENCOUNTER — HOSPITAL ENCOUNTER (OUTPATIENT)
Facility: HOSPITAL | Age: 75
Discharge: HOME | End: 2025-06-16
Payer: COMMERCIAL

## 2025-06-13 PROCEDURE — 77080 DXA BONE DENSITY AXIAL: CPT

## 2025-07-01 DIAGNOSIS — I10 ESSENTIAL (PRIMARY) HYPERTENSION: ICD-10-CM

## 2025-07-01 DIAGNOSIS — E11.9 TYPE 2 DIABETES MELLITUS WITHOUT COMPLICATION, WITHOUT LONG-TERM CURRENT USE OF INSULIN (HCC): ICD-10-CM

## 2025-07-01 DIAGNOSIS — E78.2 MIXED HYPERLIPIDEMIA: ICD-10-CM

## 2025-07-03 RX ORDER — SEMAGLUTIDE 1.34 MG/ML
INJECTION, SOLUTION SUBCUTANEOUS
Qty: 9 ML | Refills: 1 | Status: ACTIVE | OUTPATIENT
Start: 2025-07-03

## 2025-07-09 RX ORDER — NATEGLINIDE 120 MG/1
TABLET ORAL
Qty: 270 TABLET | Refills: 1 | Status: SHIPPED | OUTPATIENT
Start: 2025-07-09

## 2025-07-09 RX ORDER — SEMAGLUTIDE 1.34 MG/ML
1 INJECTION, SOLUTION SUBCUTANEOUS
Qty: 9 ML | Refills: 1 | Status: ACTIVE | OUTPATIENT
Start: 2025-07-09

## 2025-07-09 NOTE — TELEPHONE ENCOUNTER
Per last OV: . Pt to continue the Ozempic 1.0mg weekly, Empagliflozin-Metformin ER 25/1000mg ONCE PER DAY and Nateglinide 120mg TID.

## 2025-07-17 RX ORDER — EMPAGLIFLOZIN, METFORMIN HYDROCHLORIDE 25; 1000 MG/1; MG/1
1 TABLET, EXTENDED RELEASE ORAL DAILY
Qty: 90 TABLET | Refills: 1 | Status: ACTIVE | OUTPATIENT
Start: 2025-07-17

## 2025-07-19 LAB
ALBUMIN SERPL-MCNC: 4.1 G/DL (ref 3.8–4.8)
ALP SERPL-CCNC: 124 IU/L (ref 44–121)
ALT SERPL-CCNC: 14 IU/L (ref 0–32)
AST SERPL-CCNC: 16 IU/L (ref 0–40)
BILIRUB SERPL-MCNC: 0.6 MG/DL (ref 0–1.2)
BUN SERPL-MCNC: 19 MG/DL (ref 8–27)
BUN/CREAT SERPL: 29 (ref 12–28)
CALCIUM SERPL-MCNC: 9.1 MG/DL (ref 8.7–10.3)
CHLORIDE SERPL-SCNC: 99 MMOL/L (ref 96–106)
CO2 SERPL-SCNC: 22 MMOL/L (ref 20–29)
CREAT SERPL-MCNC: 0.66 MG/DL (ref 0.57–1)
EGFRCR SERPLBLD CKD-EPI 2021: 90 ML/MIN/1.73
GLOBULIN SER CALC-MCNC: 2.5 G/DL (ref 1.5–4.5)
GLUCOSE SERPL-MCNC: 193 MG/DL (ref 70–99)
POTASSIUM SERPL-SCNC: 4.3 MMOL/L (ref 3.5–5.2)
PROT SERPL-MCNC: 6.6 G/DL (ref 6–8.5)
SODIUM SERPL-SCNC: 136 MMOL/L (ref 134–144)

## 2025-07-20 LAB
EST. AVERAGE GLUCOSE BLD GHB EST-MCNC: 169 MG/DL
HBA1C MFR BLD: 7.5 %HB

## 2025-07-25 ENCOUNTER — OFFICE VISIT (OUTPATIENT)
Age: 75
End: 2025-07-25
Payer: COMMERCIAL

## 2025-07-25 VITALS
BODY MASS INDEX: 31.31 KG/M2 | SYSTOLIC BLOOD PRESSURE: 104 MMHG | HEIGHT: 64 IN | HEART RATE: 84 BPM | WEIGHT: 183.4 LBS | DIASTOLIC BLOOD PRESSURE: 68 MMHG

## 2025-07-25 DIAGNOSIS — E78.2 MIXED HYPERLIPIDEMIA: ICD-10-CM

## 2025-07-25 DIAGNOSIS — I10 ESSENTIAL (PRIMARY) HYPERTENSION: ICD-10-CM

## 2025-07-25 DIAGNOSIS — E11.9 TYPE 2 DIABETES MELLITUS WITHOUT COMPLICATION, WITHOUT LONG-TERM CURRENT USE OF INSULIN (HCC): Primary | ICD-10-CM

## 2025-07-25 PROCEDURE — 3078F DIAST BP <80 MM HG: CPT | Performed by: INTERNAL MEDICINE

## 2025-07-25 PROCEDURE — G8428 CUR MEDS NOT DOCUMENT: HCPCS | Performed by: INTERNAL MEDICINE

## 2025-07-25 PROCEDURE — G8399 PT W/DXA RESULTS DOCUMENT: HCPCS | Performed by: INTERNAL MEDICINE

## 2025-07-25 PROCEDURE — 1123F ACP DISCUSS/DSCN MKR DOCD: CPT | Performed by: INTERNAL MEDICINE

## 2025-07-25 PROCEDURE — G2211 COMPLEX E/M VISIT ADD ON: HCPCS | Performed by: INTERNAL MEDICINE

## 2025-07-25 PROCEDURE — 1090F PRES/ABSN URINE INCON ASSESS: CPT | Performed by: INTERNAL MEDICINE

## 2025-07-25 PROCEDURE — G8417 CALC BMI ABV UP PARAM F/U: HCPCS | Performed by: INTERNAL MEDICINE

## 2025-07-25 PROCEDURE — 3051F HG A1C>EQUAL 7.0%<8.0%: CPT | Performed by: INTERNAL MEDICINE

## 2025-07-25 PROCEDURE — 1036F TOBACCO NON-USER: CPT | Performed by: INTERNAL MEDICINE

## 2025-07-25 PROCEDURE — 3074F SYST BP LT 130 MM HG: CPT | Performed by: INTERNAL MEDICINE

## 2025-07-25 PROCEDURE — 99214 OFFICE O/P EST MOD 30 MIN: CPT | Performed by: INTERNAL MEDICINE

## 2025-07-25 NOTE — PROGRESS NOTES
Chief Complaint   Patient presents with    Diabetes     Pcp and pharmacy verified     History of Present Illness: Queenie Juarez is a 75 y.o. female here for follow up of diabetes.    At our visit in January 2024 her A1C was 6.3% on Ozepic 0.5mg weekly, Metformin-Jardiance XR 1000/25mg once daily and Nateglinide 120mg TID.  Pt was encouraged to keep up the good work.  In July 2024 her A1C was up to 7.8%, but she had been on prednisone for bronchitis, so we agreed to not make any changes.  In November 2024 her A1C was up from 7.8% to 8.7%. Pt encouraged to stop the snacking and eating candy all day and to  increase her physical activity. We agreed to try increasing her Ozempic 1.0mg weekly and continue the Empagliflozin-Metformin ER 25/1000mg ONCE PER DAY and Nateglinide 120mg TID.     Pt denies any recent illnesses, injuries or hospitalizations.    She has not been on any steroids, or prednisone since our last visit.    Pt is still taking the Nateglinide 120mg TID, Metformin-Jardiance ER 1000/25mg daily and Ozempic 1.0mg every Monday.  (Pt was not able to tolerate more than 1000mg daily of the metformin).    She tests her BG every Monday, before she takes her Ozempic, after breakfast. Her BG run in the 140-160s.    Her A1C today was 7.5%.    \"I have gotten to the point that I get so lazy I can't move and my knees hurt.\"    \"I have not had any falls since May 2022.\"    She is still taking the Anastrazole. \"I see my oncologist next month and I think he will take me off the Anastrazole, it will have been my 5 year anniversary.\"    - Pt wakes around 8AM.  - She has breakfast around 830AM, yesterday she had oatmeal water.             - She will occasionally have PB crackers, or tomato juice.   - She has lunch around Noon, yesterday she had a grilled chicken salad and water.    - She will have a mid-afternoon snack of PB crackers, chips, or fruit.      - She has dinner around 530-6PM, last night she had one slice of pizza,